# Patient Record
Sex: FEMALE | Race: WHITE | Employment: FULL TIME | URBAN - METROPOLITAN AREA
[De-identification: names, ages, dates, MRNs, and addresses within clinical notes are randomized per-mention and may not be internally consistent; named-entity substitution may affect disease eponyms.]

---

## 2017-04-24 ENCOUNTER — TELEPHONE (OUTPATIENT)
Dept: INTERNAL MEDICINE CLINIC | Age: 27
End: 2017-04-24

## 2017-04-24 NOTE — TELEPHONE ENCOUNTER
Pt got a bill from [facility] and stated that she received a bill for date of service: 12/19/2016, stated she was told [employer] faxed us something about resubmitting with different codes, still getting the bill and is wondering if the codes were changed or not.

## 2017-04-25 NOTE — TELEPHONE ENCOUNTER
Per , pt may need to bring in the bill. She said that Fabienne Myles was helping pt with this issue in December. Unsure why she is receiving the same bill.

## 2017-05-02 ENCOUNTER — TELEPHONE (OUTPATIENT)
Dept: INTERNAL MEDICINE CLINIC | Age: 27
End: 2017-05-02

## 2017-05-02 NOTE — TELEPHONE ENCOUNTER
Pt called back, stated she thinks it is not coded right, I told her I'd call my Manager and ask her to look and see if it had been re-coded and I would call her back, [phone]

## 2017-05-02 NOTE — TELEPHONE ENCOUNTER
----- Message from Marlys Arboleda sent at 5/2/2017  2:00 PM EDT -----  Regarding: Dr. Yohan Meza is following up regarding bill received from [employer] for services rendered on 12/19/2016. Pt states doctor advised the codes on the bill were wrong and needed to be fixed. Pt would like to know how long it will take for the codes to be updated and when she will have an updated bill. Pt is going out of town on Thursday 05/04/2017 and does not want to be late paying the bill. Best contact number [phone].

## 2017-05-02 NOTE — TELEPHONE ENCOUNTER
Called pt, voicemail was not set up yet. When I looked at the bill it looked to me like the balance was from her Deductible, but someone had been looking at it in the past and I do not know who was looking at it or if it was re-coded.  I will inform pt that from what I see it is from a deductible and pt might be responsible for bill

## 2017-08-02 ENCOUNTER — OFFICE VISIT (OUTPATIENT)
Dept: INTERNAL MEDICINE CLINIC | Age: 27
End: 2017-08-02

## 2017-08-02 VITALS
RESPIRATION RATE: 18 BRPM | HEART RATE: 69 BPM | BODY MASS INDEX: 23.26 KG/M2 | TEMPERATURE: 97 F | WEIGHT: 139.6 LBS | OXYGEN SATURATION: 99 % | DIASTOLIC BLOOD PRESSURE: 64 MMHG | SYSTOLIC BLOOD PRESSURE: 99 MMHG | HEIGHT: 65 IN

## 2017-08-02 DIAGNOSIS — Z00.00 WELL ADULT ON ROUTINE HEALTH CHECK: Primary | ICD-10-CM

## 2017-08-02 NOTE — PATIENT INSTRUCTIONS

## 2017-08-02 NOTE — PROGRESS NOTES
Subjective:   23 y.o. female for Well Woman Check. No significant past medical history. Doing well. Pap is up to date. Patient Active Problem List   Diagnosis Code    Sinusitis J32.9    FHx: allergies Z84.89    Elevated liver enzymes R74.8     Current Outpatient Prescriptions   Medication Sig Dispense Refill    fexofenadine (ALLEGRA) 180 mg tablet Take 1 Tab by mouth daily. 30 Tab 0     Allergies   Allergen Reactions    Cucumber Fruit Extract Hives     Past Medical History:   Diagnosis Date    Calculus of kidney     Environmental allergies      Past Surgical History:   Procedure Laterality Date    HX UROLOGICAL      Cystoscopic kidney stone removal     Family History   Problem Relation Age of Onset    Gall Bladder Disease Mother     No Known Problems Father      Social History   Substance Use Topics    Smoking status: Never Smoker    Smokeless tobacco: Never Used    Alcohol use No        Lab Results  Component Value Date/Time   Cholesterol, total 150 07/25/2016 09:29 AM   HDL Cholesterol 64 07/25/2016 09:29 AM   LDL, calculated 75 07/25/2016 09:29 AM   Triglyceride 53 07/25/2016 09:29 AM     Lab Results  Component Value Date/Time   ALT (SGPT) 17 11/02/2016 12:30 PM   AST (SGOT) 17 11/02/2016 12:30 PM   Alk. phosphatase 67 11/02/2016 12:30 PM   Bilirubin, direct 0.11 11/02/2016 12:30 PM   Bilirubin, total 0.4 11/02/2016 12:30 PM   Albumin 4.4 11/02/2016 12:30 PM   Protein, total 7.0 11/02/2016 12:30 PM   PLATELET 393 92/03/1853 09:29 AM              ROS: Feeling generally well. No TIA's or unusual headaches, no dysphagia. No prolonged cough. No dyspnea or chest pain on exertion. No abdominal pain, change in bowel habits, black or bloody stools. No urinary tract symptoms. No new or unusual musculoskeletal symptoms. Specific concerns today: none. Objective: The patient appears well, alert, oriented x 3, in no distress. Visit Vitals    BP 99/64 (BP 1 Location: Left arm, BP Patient Position: Sitting)    Pulse 69    Temp 97 °F (36.1 °C) (Oral)    Resp 18    Ht 5' 4.5\" (1.638 m)    Wt 139 lb 9.6 oz (63.3 kg)    LMP 07/27/2017    SpO2 99%    BMI 23.59 kg/m2     ENT normal.  Neck supple. No adenopathy or thyromegaly. MOUSTAPHA. Lungs are clear, good air entry, no wheezes, rhonchi or rales. S1 and S2 normal, no murmurs, regular rate and rhythm. Abdomen soft without tenderness, guarding, mass or organomegaly. Extremities show no edema, normal peripheral pulses. Neurological is normal, no focal findings. Breast and Pelvic exams are deferred. Assessment/Plan:   Well Woman  follow low fat diet, continue present plan, routine labs ordered, call if any problems    ICD-10-CM ICD-9-CM    1. Well adult on routine health check Z00.00 V70.0 LIPID PANEL      METABOLIC PANEL, COMPREHENSIVE      CANCELED: METABOLIC PANEL, BASIC     Diagnoses and all orders for this visit:    1.  Well adult on routine health check  -     LIPID PANEL  -     METABOLIC PANEL, COMPREHENSIVE      Follow-up Disposition: Not on File  reviewed diet, exercise and weight control

## 2017-08-02 NOTE — MR AVS SNAPSHOT
Visit Information Date & Time Provider Department Dept. Phone Encounter #  
 8/2/2017  1:30 PM Vijaya Lindsay MD [facility] Internal Medicine [phone] [id number] Upcoming Health Maintenance Date Due  
 HPV AGE 9Y-34Y (1 of 3 - Female 3 Dose Series) 8/31/2002 DTaP/Tdap/Td series (1 - Tdap) 8/31/2012 INFLUENZA AGE 9 TO ADULT 8/1/2017 PAP AKA CERVICAL CYTOLOGY 12/19/2019 Allergies as of 8/2/2017  Review Complete On: 8/2/2017 By: Deirdre Valera LPN Severity Noted Reaction Type Reactions Cucumber Fruit Extract  07/22/2015    Hives Current Immunizations  Reviewed on 11/2/2016 No immunizations on file. Not reviewed this visit You Were Diagnosed With   
  
 Codes Comments Well adult on routine health check    -  Primary ICD-10-CM: Z00.00 ICD-9-CM: V70.0 Vitals BP Pulse Temp Resp Height(growth percentile) Weight(growth percentile) 99/64 (BP 1 Location: Left arm, BP Patient Position: Sitting) 69 97 °F (36.1 °C) (Oral) 18 5' 4.5\" (1.638 m) 139 lb 9.6 oz (63.3 kg) LMP SpO2 BMI OB Status Smoking Status 07/27/2017 99% 23.59 kg/m2 Having regular periods Never Smoker Vitals History BMI and BSA Data Body Mass Index Body Surface Area  
 23.59 kg/m 2 1.7 m 2 Preferred Pharmacy Pharmacy Name Phone [phone] Your Updated Medication List  
  
   
This list is accurate as of: 8/2/17  2:24 PM.  Always use your most recent med list.  
  
  
  
  
 fexofenadine 180 mg tablet Commonly known as:  [place] Take 1 Tab by mouth daily. We Performed the Following LIPID PANEL [86039 CPT(R)] METABOLIC PANEL, BASIC [90817 CPT(R)] Patient Instructions Well Visit, Ages 25 to 48: Care Instructions Your Care Instructions Physical exams can help you stay healthy.  Your doctor has checked your overall health and may have suggested ways to take good care of yourself. He or she also may have recommended tests. At home, you can help prevent illness with healthy eating, regular exercise, and other steps. Follow-up care is a key part of your treatment and safety. Be sure to make and go to all appointments, and call your doctor if you are having problems. It's also a good idea to know your test results and keep a list of the medicines you take. How can you care for yourself at home? · Reach and stay at a healthy weight. This will lower your risk for many problems, such as obesity, diabetes, heart disease, and high blood pressure. · Get at least 30 minutes of physical activity on most days of the week. Walking is a good choice. You also may want to do other activities, such as running, swimming, cycling, or playing tennis or team sports. Discuss any changes in your exercise program with your doctor. · Do not smoke or allow others to smoke around you. If you need help quitting, talk to your doctor about stop-smoking programs and medicines. These can increase your chances of quitting for good. · Talk to your doctor about whether you have any risk factors for sexually transmitted infections (STIs). Having one sex partner (who does not have STIs and does not have sex with anyone else) is a good way to avoid these infections. · Use birth control if you do not want to have children at this time. Talk with your doctor about the choices available and what might be best for you. · Protect your skin from too much sun. When you're outdoors from 10 a.m. to 4 p.m., stay in the shade or cover up with clothing and a hat with a wide brim. Wear sunglasses that block UV rays. Even when it's cloudy, put broad-spectrum sunscreen (SPF 30 or higher) on any exposed skin. · See a dentist one or two times a year for checkups and to have your teeth cleaned. · Wear a seat belt in the car. · Drink alcohol in moderation, if at all. That means no more than 2 drinks a day for men and 1 drink a day for women. Follow your doctor's advice about when to have certain tests. These tests can spot problems early. For everyone · Cholesterol. Have the fat (cholesterol) in your blood tested after age 21. Your doctor will tell you how often to have this done based on your age, family history, or other things that can increase your risk for heart disease. · Blood pressure. Have your blood pressure checked during a routine doctor visit. Your doctor will tell you how often to check your blood pressure based on your age, your blood pressure results, and other factors. · Vision. Talk with your doctor about how often to have a glaucoma test. 
· Diabetes. Ask your doctor whether you should have tests for diabetes. · Colon cancer. Have a test for colon cancer at age 48. You may have one of several tests. If you are younger than 48, you may need a test earlier if you have any risk factors. Risk factors include whether you already had a precancerous polyp removed from your colon or whether your parent, brother, sister, or child has had colon cancer. For women · Breast exam and mammogram. Talk to your doctor about when you should have a clinical breast exam and a mammogram. Medical experts differ on whether and how often women under 50 should have these tests. Your doctor can help you decide what is right for you. · Pap test and pelvic exam. Begin Pap tests at age 24. A Pap test is the best way to find cervical cancer. The test often is part of a pelvic exam. Ask how often to have this test. 
· Tests for sexually transmitted infections (STIs). Ask whether you should have tests for STIs. You may be at risk if you have sex with more than one person, especially if your partners do not wear condoms. For men · Tests for sexually transmitted infections (STIs).  Ask whether you should have tests for STIs. You may be at risk if you have sex with more than one person, especially if you do not wear a condom. · Testicular cancer exam. Ask your doctor whether you should check your testicles regularly. · Prostate exam. Talk to your doctor about whether you should have a blood test (called a PSA test) for prostate cancer. Experts differ on whether and when men should have this test. Some experts suggest it if you are older than 39 and are -American or have a father or brother who got prostate cancer when he was younger than 72. When should you call for help? Watch closely for changes in your health, and be sure to contact your doctor if you have any problems or symptoms that concern you. Where can you learn more? Go to http://alma rosa-alpesh.info/. Enter P072 in the search box to learn more about \"Well Visit, Ages 25 to 48: Care Instructions. \" Current as of: July 19, 2016 Content Version: 11.3 © 4822-1046 [employer]. Care instructions adapted under license by [employer] (which disclaims liability or warranty for this information). If you have questions about a medical condition or this instruction, always ask your healthcare professional. [place] [address] any warranty or liability for your use of this information. Introducing [facility]! Chau Suarez introduces [employer] patient portal. Now you can access parts of your medical record, email your doctor's office, and request medication refills online. 1. In your internet browser, go to [email or web address] 2. Click on the First Time User? Click Here link in the Sign In box. You will see the New Member Sign Up page. 3. Enter your [employer] Access Code exactly as it appears below. You will not need to use this code after youve completed the sign-up process. If you do not sign up before the expiration date, you must request a new code. · [employer] Access Code: [email or web address] Expires: 10/31/2017  2:24 PM 
 
4. Enter the last four digits of your Social Security Number (xxxx) and Date of Birth (mm/dd/yyyy) as indicated and click Submit. You will be taken to the next sign-up page. 5. Create a [employer] ID. This will be your [employer] login ID and cannot be changed, so think of one that is secure and easy to remember. 6. Create a [employer] password. You can change your password at any time. 7. Enter your Password Reset Question and Answer. This can be used at a later time if you forget your password. 8. Enter your e-mail address. You will receive e-mail notification when new information is available in [address] 9. Click Sign Up. You can now view and download portions of your medical record. 10. Click the Download Summary menu link to download a portable copy of your medical information. If you have questions, please visit the Frequently Asked Questions section of the [employer] website. Remember, [employer] is NOT to be used for urgent needs. For medical emergencies, dial 911. Now available from your iPhone and Android! Please provide this summary of care documentation to your next provider. Your primary care clinician is listed as Howard Waddell. If you have any questions after today's visit, please call [phone].

## 2017-08-03 ENCOUNTER — TELEPHONE (OUTPATIENT)
Dept: INTERNAL MEDICINE CLINIC | Age: 27
End: 2017-08-03

## 2017-08-03 LAB
ALBUMIN SERPL-MCNC: 4.5 G/DL (ref 3.5–5.5)
ALBUMIN/GLOB SERPL: 1.8 {RATIO} (ref 1.2–2.2)
ALP SERPL-CCNC: 68 IU/L (ref 39–117)
ALT SERPL-CCNC: 35 IU/L (ref 0–32)
AST SERPL-CCNC: 25 IU/L (ref 0–40)
BILIRUB SERPL-MCNC: 0.5 MG/DL (ref 0–1.2)
BUN SERPL-MCNC: 9 MG/DL (ref 6–20)
BUN/CREAT SERPL: 16 (ref 9–23)
CALCIUM SERPL-MCNC: 9.1 MG/DL (ref 8.7–10.2)
CHLORIDE SERPL-SCNC: 103 MMOL/L (ref 96–106)
CHOLEST SERPL-MCNC: 141 MG/DL (ref 100–199)
CO2 SERPL-SCNC: 24 MMOL/L (ref 18–29)
CREAT SERPL-MCNC: 0.58 MG/DL (ref 0.57–1)
GLOBULIN SER CALC-MCNC: 2.5 G/DL (ref 1.5–4.5)
GLUCOSE SERPL-MCNC: 78 MG/DL (ref 65–99)
HDLC SERPL-MCNC: 54 MG/DL
INTERPRETATION, 910389: NORMAL
LDLC SERPL CALC-MCNC: 81 MG/DL (ref 0–99)
POTASSIUM SERPL-SCNC: 4.1 MMOL/L (ref 3.5–5.2)
PROT SERPL-MCNC: 7 G/DL (ref 6–8.5)
SODIUM SERPL-SCNC: 140 MMOL/L (ref 134–144)
TRIGL SERPL-MCNC: 31 MG/DL (ref 0–149)
VLDLC SERPL CALC-MCNC: 6 MG/DL (ref 5–40)

## 2017-08-03 NOTE — TELEPHONE ENCOUNTER
----- Message from Shiva Correa MD sent at 8/3/2017 10:20 AM EDT -----  Please let her know that:  Kidney, liver, cholesterol level is normal.     Pl fax the copy wellness physical form to her employer.

## 2017-08-03 NOTE — PROGRESS NOTES
Please let her know that:  Kidney, liver, cholesterol level is normal.     Pl fax the copy wellness physical form to her employer.

## 2017-08-03 NOTE — TELEPHONE ENCOUNTER
Discussed results and recommendations. Pt verbalized her understanding. Requests for employer screening form to be faxed to her at [phone].

## 2017-12-08 ENCOUNTER — OFFICE VISIT (OUTPATIENT)
Dept: INTERNAL MEDICINE CLINIC | Age: 27
End: 2017-12-08

## 2017-12-08 VITALS
BODY MASS INDEX: 24.16 KG/M2 | HEIGHT: 65 IN | TEMPERATURE: 97.8 F | SYSTOLIC BLOOD PRESSURE: 92 MMHG | DIASTOLIC BLOOD PRESSURE: 61 MMHG | WEIGHT: 145 LBS | OXYGEN SATURATION: 100 % | HEART RATE: 95 BPM | RESPIRATION RATE: 18 BRPM

## 2017-12-08 DIAGNOSIS — R09.81 NASAL CONGESTION: ICD-10-CM

## 2017-12-08 DIAGNOSIS — J20.9 ACUTE BRONCHITIS, UNSPECIFIED ORGANISM: Primary | ICD-10-CM

## 2017-12-08 DIAGNOSIS — R05.3 PERSISTENT DRY COUGH: ICD-10-CM

## 2017-12-08 RX ORDER — ALBUTEROL SULFATE 90 UG/1
1 AEROSOL, METERED RESPIRATORY (INHALATION)
Qty: 1 INHALER | Refills: 0 | Status: SHIPPED | OUTPATIENT
Start: 2017-12-08 | End: 2019-02-05 | Stop reason: SDUPTHER

## 2017-12-08 RX ORDER — FLUTICASONE PROPIONATE 50 MCG
2 SPRAY, SUSPENSION (ML) NASAL DAILY
Qty: 1 BOTTLE | Refills: 0 | Status: SHIPPED | OUTPATIENT
Start: 2017-12-08 | End: 2017-12-20

## 2017-12-08 RX ORDER — PROMETHAZINE HYDROCHLORIDE AND CODEINE PHOSPHATE 6.25; 1 MG/5ML; MG/5ML
5 SOLUTION ORAL
Qty: 30 ML | Refills: 0 | Status: SHIPPED | OUTPATIENT
Start: 2017-12-08 | End: 2018-06-22 | Stop reason: ALTCHOICE

## 2017-12-08 NOTE — LETTER
NOTIFICATION RETURN TO WORK / SCHOOL 
 
12/8/2017 11:02 AM 
 
Ms. [address] 
[place] [address] To Whom It May Concern: 
 
Ivett Becerra is currently under the care of Santhosh. She will return to work/school on: 12/11/17 If there are questions or concerns please have the patient contact our office.  
 
 
 
Sincerely, 
 
 
Layla Eric MD

## 2017-12-08 NOTE — PROGRESS NOTES
Subjective:     Chief Complaint   Patient presents with    Shortness of Breath    Cough        She  is a 33y.o. year old 24 weeks pregnant  female who presents today with a complain of persistent dry cough, chest tightness for the past two weeks. Reports that she went to [facility] ED two weeks ago, diagnosed with UTI and bronchitis. She was sent home with amoxicillin. Reports that she is no longer having any UTI symptoms, sore throat been gone but the cough persists. Nose feels stuffy. Feels chest tightness, sob, cannot sleep at night due to cough. Denies fever, sore throat. Did not try any OTC med. Patient has taken Phenergan-codeine in the past which helped with her cough. Pertinent items are noted in HPI. Objective:     Vitals:    12/08/17 1034   BP: 92/61   Pulse: 95   Resp: 18   Temp: 97.8 °F (36.6 °C)   TempSrc: Oral   SpO2: 100%   Weight: 145 lb (65.8 kg)   Height: 5' 4.5\" (1.638 m)       Physical Examination: General appearance - alert, well appearing, and in no distress, oriented to person, place, and time, normal appearing weight and coughing persistently.   Mental status - alert, oriented to person, place, and time, normal mood, behavior, speech, dress, motor activity, and thought processes  Ears - bilateral TM's and external ear canals normal  Nose - normal and patent, no erythema, discharge or polyps, normal nontender sinuses and mucosal congestion  Mouth - mucous membranes moist, pharynx normal without lesions  Neck - supple, no significant adenopathy  Chest - clear to auscultation, no wheezes, rales or rhonchi, symmetric air entry  Heart - normal rate, regular rhythm, normal S1, S2, no murmurs, rubs, clicks or gallops    Allergies   Allergen Reactions    Cucumber Fruit Extract Hives      Social History     Social History    Marital status:      Spouse name: N/A    Number of children: N/A    Years of education: N/A     Social History Main Topics    Smoking status: Never Smoker    Smokeless tobacco: Never Used    Alcohol use No    Drug use: No    Sexual activity: Yes     Birth control/ protection: None     Other Topics Concern    None     Social History Narrative      Family History   Problem Relation Age of Onset    Gall Bladder Disease Mother     No Known Problems Father       Past Surgical History:   Procedure Laterality Date    HX UROLOGICAL      Cystoscopic kidney stone removal      Past Medical History:   Diagnosis Date    Calculus of kidney     Environmental allergies       Current Outpatient Prescriptions   Medication Sig Dispense Refill    Cetirizine (ZYRTEC) 10 mg cap Take  by mouth.  albuterol (PROVENTIL HFA, VENTOLIN HFA, PROAIR HFA) 90 mcg/actuation inhaler Take 1 Puff by inhalation every four (4) hours as needed for Shortness of Breath. 1 Inhaler 0    promethazine-codeine (PHENERGAN WITH CODEINE) 6.25-10 mg/5 mL syrup Take 5 mL by mouth every six (6) hours as needed for Cough. Max Daily Amount: 20 mL. 30 mL 0    fluticasone (FLONASE) 50 mcg/actuation nasal spray 2 Sprays by Both Nostrils route daily. 1 Bottle 0    ORGAN-I  mg tablet TAKE 1 TABLET BY MOUTH EVERY 4 HOURS AS NEEDED  0        Assessment/ Plan:   Diagnoses and all orders for this visit:    1. Acute bronchitis, unspecified organism  -     albuterol (PROVENTIL HFA, VENTOLIN HFA, PROAIR HFA) 90 mcg/actuation inhaler; Take 1 Puff by inhalation every four (4) hours as needed for Shortness of Breath. -     promethazine-codeine (PHENERGAN WITH CODEINE) 6.25-10 mg/5 mL syrup; Take 5 mL by mouth every six (6) hours as needed for Cough. Max Daily Amount: 20 mL. 2. Persistent dry cough  -     albuterol (PROVENTIL HFA, VENTOLIN HFA, PROAIR HFA) 90 mcg/actuation inhaler; Take 1 Puff by inhalation every four (4) hours as needed for Shortness of Breath. -     promethazine-codeine (PHENERGAN WITH CODEINE) 6.25-10 mg/5 mL syrup; Take 5 mL by mouth every six (6) hours as needed for Cough.  Max Daily Amount: 20 mL.    3. Nasal congestion  -     fluticasone (FLONASE) 50 mcg/actuation nasal spray; 2 Sprays by Both Nostrils route daily. Mist inhalation, rest, hydration. Follow up if symptoms worsen. Medication risks/benefits/costs/interactions/alternatives discussed with patient. Advised patient to call back or return to office if symptoms worsen/change/persist. If patient cannot reach us or should anything more severe/urgent arise he/she should proceed directly to the nearest emergency department. Discussed expected course/resolution/complications of diagnosis in detail with patient. Patient given a written after visit summary which includes her diagnoses, current medications and vitals. Patient expressed understanding with the diagnosis and plan. Follow-up Disposition:  Return if symptoms worsen or fail to improve.

## 2017-12-20 ENCOUNTER — TELEPHONE (OUTPATIENT)
Dept: INTERNAL MEDICINE CLINIC | Age: 27
End: 2017-12-20

## 2017-12-20 RX ORDER — MOMETASONE FUROATE 50 UG/1
2 SPRAY, METERED NASAL DAILY
Qty: 1 CONTAINER | Refills: 2 | Status: SHIPPED | OUTPATIENT
Start: 2017-12-20 | End: 2019-08-08

## 2017-12-20 NOTE — TELEPHONE ENCOUNTER
Please inform patient that flonase was not covered by her insurance. Alternative med sent to pharmacy.

## 2017-12-21 ENCOUNTER — TELEPHONE (OUTPATIENT)
Dept: INTERNAL MEDICINE CLINIC | Age: 27
End: 2017-12-21

## 2017-12-21 NOTE — TELEPHONE ENCOUNTER
----- Message from Tona Arias sent at 12/21/2017  1:06 PM EST -----  Regarding: Dr. Ethel Joel is returning a call to the practice, best contact: [phone]

## 2017-12-21 NOTE — TELEPHONE ENCOUNTER
Informed pt that prescriptions for nasonex and Flonase were not covered by insurance. They want her to get it OTC. Pt verbalized her understanding. States that the pharmacist already advised her of this.

## 2018-06-22 ENCOUNTER — OFFICE VISIT (OUTPATIENT)
Dept: INTERNAL MEDICINE CLINIC | Age: 28
End: 2018-06-22

## 2018-06-22 VITALS
OXYGEN SATURATION: 97 % | TEMPERATURE: 97.4 F | RESPIRATION RATE: 17 BRPM | SYSTOLIC BLOOD PRESSURE: 108 MMHG | BODY MASS INDEX: 25.16 KG/M2 | HEIGHT: 65 IN | HEART RATE: 62 BPM | DIASTOLIC BLOOD PRESSURE: 65 MMHG | WEIGHT: 151 LBS

## 2018-06-22 DIAGNOSIS — J34.89 STUFFY AND RUNNY NOSE: ICD-10-CM

## 2018-06-22 DIAGNOSIS — J02.9 SORE THROAT: Primary | ICD-10-CM

## 2018-06-22 DIAGNOSIS — R06.7 SNEEZING: ICD-10-CM

## 2018-06-22 LAB
S PYO AG THROAT QL: NEGATIVE
VALID INTERNAL CONTROL?: YES

## 2018-06-22 RX ORDER — MINERAL OIL
ENEMA (ML) RECTAL
COMMUNITY
End: 2019-08-08

## 2018-06-22 RX ORDER — FLUTICASONE PROPIONATE 50 MCG
2 SPRAY, SUSPENSION (ML) NASAL DAILY
Qty: 1 BOTTLE | Refills: 0 | Status: SHIPPED | OUTPATIENT
Start: 2018-06-22 | End: 2019-08-08

## 2018-06-22 RX ORDER — LEVONORGESTREL AND ETHINYL ESTRADIOL 0.1-0.02MG
KIT ORAL
COMMUNITY
Start: 2018-06-21

## 2018-06-22 NOTE — MR AVS SNAPSHOT
[facility] [address] 
[phone] Patient: Mendez Sky MRN: [id number] Marie Howell Visit Information Date & Time Provider Department Dept. Phone Encounter #  
 6/22/2018  9:45 AM Howard Boles [facility] [phone] [id number] Follow-up Instructions Return if symptoms worsen or fail to improve. Upcoming Health Maintenance Date Due  
 HPV Age 9Y-34Y (1 of 1 - Female 3 Dose Series) 8/31/2002 DTaP/Tdap/Td series (1 - Tdap) 8/31/2012 Influenza Age 5 to Adult 8/1/2018 PAP AKA CERVICAL CYTOLOGY 12/19/2019 Allergies as of 6/22/2018  Review Complete On: 6/22/2018 By: Sofie Nageotte, MD  
  
 Severity Noted Reaction Type Reactions Cucumber Fruit Extract  07/22/2015    Hives Current Immunizations  Reviewed on 11/2/2016 No immunizations on file. Not reviewed this visit You Were Diagnosed With   
  
 Codes Comments Sore throat    -  Primary ICD-10-CM: J02.9 ICD-9-CM: 086 Stuffy and runny nose     ICD-10-CM: J34.89 ICD-9-CM: 478.19 Sneezing     ICD-10-CM: R06.7 ICD-9-CM: 784.99 Vitals BP Pulse Temp Resp Height(growth percentile) Weight(growth percentile) 108/65 (BP 1 Location: Left arm, BP Patient Position: Sitting) 62 97.4 °F (36.3 °C) (Temporal) 17 5' 4.5\" (1.638 m) 151 lb (68.5 kg) LMP SpO2 Breastfeeding? BMI OB Status Smoking Status 06/13/2018 97% Unknown 25.52 kg/m2 Recent pregnancy Never Smoker Vitals History BMI and BSA Data Body Mass Index Body Surface Area 25.52 kg/m 2 1.77 m 2 Preferred Pharmacy Pharmacy Name Phone [id number] [place], [address] [facility] [phone] Your Updated Medication List  
  
   
This list is accurate as of 6/22/18 10:14 AM.  Always use your most recent med list.  
  
  
  
  
 albuterol 90 mcg/actuation inhaler Commonly known as:  PROVENTIL HFA, VENTOLIN HFA, PROAIR HFA Take 1 Puff by inhalation every four (4) hours as needed for Shortness of Breath. fexofenadine 180 mg tablet Commonly known as:  Nishant Perks Take  by mouth. fluticasone 50 mcg/actuation nasal spray Commonly known as:  [place] 2 Sprays by Both Nostrils route daily. LARISSIA 0.1-20 mg-mcg Tab Generic drug:  levonorgestrel-ethinyl estradiol  
  
 mometasone 50 mcg/actuation nasal spray Commonly known as:  NASONEX  
2 Sprays by Both Nostrils route daily. ZyrTEC 10 mg Cap Generic drug:  Cetirizine Take  by mouth. Prescriptions Sent to Pharmacy Refills  
 fluticasone (FLONASE) 50 mcg/actuation nasal spray 0 Si Sprays by Both Nostrils route daily. Class: Normal  
 Pharmacy: [facility] [place] [address] #: [phone] Route: Both Nostrils We Performed the Following AMB POC RAPID STREP A [66288 CPT(R)] CULTURE, STREP THROAT [id number] CPT(R)] Follow-up Instructions Return if symptoms worsen or fail to improve. Introducing [facility]! Ale Kincaid introduces [employer] patient portal. Now you can access parts of your medical record, email your doctor's office, and request medication refills online. 1. In your internet browser, go to [email or web address] 2. Click on the First Time User? Click Here link in the Sign In box. You will see the New Member Sign Up page. 3. Enter your [employer] Access Code exactly as it appears below. You will not need to use this code after youve completed the sign-up process. If you do not sign up before the expiration date, you must request a new code. · [employer] Access Code: [email or web address] Expires: 2018 10:14 AM 
 
4.  Enter the last four digits of your Social Security Number (xxxx) and Date of Birth (mm/dd/yyyy) as indicated and click Submit. You will be taken to the next sign-up page. 5. Create a [employer] ID. This will be your [employer] login ID and cannot be changed, so think of one that is secure and easy to remember. 6. Create a [employer] password. You can change your password at any time. 7. Enter your Password Reset Question and Answer. This can be used at a later time if you forget your password. 8. Enter your e-mail address. You will receive e-mail notification when new information is available in [address] 9. Click Sign Up. You can now view and download portions of your medical record. 10. Click the Download Summary menu link to download a portable copy of your medical information. If you have questions, please visit the Frequently Asked Questions section of the [employer] website. Remember, [employer] is NOT to be used for urgent needs. For medical emergencies, dial 911. Now available from your iPhone and Android! Please provide this summary of care documentation to your next provider. Your primary care clinician is listed as Howard Waddell. If you have any questions after today's visit, please call [phone].

## 2018-06-25 ENCOUNTER — TELEPHONE (OUTPATIENT)
Dept: INTERNAL MEDICINE CLINIC | Age: 28
End: 2018-06-25

## 2018-06-25 LAB — S PYO THROAT QL CULT: ABNORMAL

## 2018-06-25 RX ORDER — BENZONATATE 200 MG/1
200 CAPSULE ORAL
Qty: 21 CAP | Refills: 0 | Status: SHIPPED | OUTPATIENT
Start: 2018-06-25 | End: 2018-07-02

## 2018-06-25 NOTE — PROGRESS NOTES
Subjective:     Chief Complaint   Patient presents with    Sore Throat     started yesterday. Went to ER yesterday and nothing was done. Was told that it was allergies    Nasal Discharge    Headache    Cough     dry cough        She  is a 33y.o. year old female who presents with a c/o sore throat started yesterday associated with sneezing, clear runny nose, head congestion and dry cough. States that she went to [address] yesterday and was told that this is allergy related. Started taking allegra and zyrtec sinec yesterday but did not see nay improvement. Denies any fever, chills, soa, wheezing associated with that. Pertinent items are noted in HPI.   Objective:     Vitals:    06/22/18 0950   BP: 108/65   Pulse: 62   Resp: 17   Temp: 97.4 °F (36.3 °C)   TempSrc: Temporal   SpO2: 97%   Weight: 151 lb (68.5 kg)   Height: 5' 4.5\" (1.638 m)       Physical Examination: General appearance - alert, well appearing, and in no distress, oriented to person, place, and time and normal appearing weight  Mental status - alert, oriented to person, place, and time, normal mood, behavior, speech, dress, motor activity, and thought processes  Eyes - pupils equal and reactive, extraocular eye movements intact  Ears - bilateral TM's and external ear canals normal  Nose - normal nontender sinuses, mucosal congestion and clear rhinorrhea  Mouth - mucous membranes moist, pharynx slightly erythematous without lesions and tonsils hypertrophied with no exudate  Neck - supple, no significant adenopathy  Chest - clear to auscultation, no wheezes, rales or rhonchi, symmetric air entry  Heart - normal rate, regular rhythm, normal S1, S2, no murmurs, rubs, clicks or gallops    Allergies   Allergen Reactions    Cucumber Fruit Extract Hives      Social History     Social History    Marital status:      Spouse name: N/A    Number of children: N/A    Years of education: N/A     Social History Main Topics    Smoking status: Never Smoker  Smokeless tobacco: Never Used    Alcohol use No    Drug use: No    Sexual activity: Yes     Birth control/ protection: None     Other Topics Concern    None     Social History Narrative      Family History   Problem Relation Age of Onset    Gall Bladder Disease Mother     No Known Problems Father       Past Surgical History:   Procedure Laterality Date    HX UROLOGICAL      Cystoscopic kidney stone removal      Past Medical History:   Diagnosis Date    Calculus of kidney     Environmental allergies       Current Outpatient Prescriptions   Medication Sig Dispense Refill    LARISSIA 0.1-20 mg-mcg tab       fexofenadine (ALLEGRA) 180 mg tablet Take  by mouth.  fluticasone (FLONASE) 50 mcg/actuation nasal spray 2 Sprays by Both Nostrils route daily. 1 Bottle 0    mometasone (NASONEX) 50 mcg/actuation nasal spray 2 Sprays by Both Nostrils route daily. 1 Container 2    Cetirizine (ZYRTEC) 10 mg cap Take  by mouth.  albuterol (PROVENTIL HFA, VENTOLIN HFA, PROAIR HFA) 90 mcg/actuation inhaler Take 1 Puff by inhalation every four (4) hours as needed for Shortness of Breath. 1 Inhaler 0        Assessment/ Plan:   Diagnoses and all orders for this visit:    1. Sore throat  -     AMB POC RAPID STREP A  -     CULTURE, STREP THROAT        - advised salt water gurgle, OTC tylenol/advil for pain. 2. Stuffy and runny nose  -    start fluticasone (FLONASE) 50 mcg/actuation nasal spray; 2 Sprays by Both Nostrils route daily. - Continue current allergy med. -  Mist inhalation. 3. Sneezing  -    start fluticasone (FLONASE) 50 mcg/actuation nasal spray; 2 Sprays by Both Nostrils route daily. - Continue current allergy med. -  Mist inhalation. Medication risks/benefits/costs/interactions/alternatives discussed with patient.   Advised patient to call back or return to office if symptoms worsen/change/persist. If patient cannot reach us or should anything more severe/urgent arise he/she should proceed directly to the nearest emergency department. Discussed expected course/resolution/complications of diagnosis in detail with patient. Patient given a written after visit summary which includes her diagnoses, current medications and vitals. Patient expressed understanding with the diagnosis and plan. Follow-up Disposition:  Return if symptoms worsen or fail to improve.

## 2018-06-25 NOTE — PROGRESS NOTES
Left voice mail to call us back    Let me know how is she feeling. Will send Abx if she continue to have any sore throat.

## 2018-06-25 NOTE — TELEPHONE ENCOUNTER
----- Message from Antoine Mcneil LPN sent at 8/02/3909 12:39 PM EDT -----      ----- Message -----     From: Gregory Arguello MD     Sent: 6/25/2018   9:38 AM       To: Antoine Mcneil LPN    Left voice mail to call us back    Let me know how is she feeling. Will send Abx if she continue to have any sore throat.

## 2018-06-25 NOTE — TELEPHONE ENCOUNTER
Pt states that she not having any sore throat but still coughing really bad at night. Pt states that she has been taking mucinex and it has been helping. Advised per , to continue mucinex and tessalon pearls will be sent to the pharmacy to take as needed for cough. Pt verbalized her understanding.

## 2018-06-25 NOTE — TELEPHONE ENCOUNTER
Pt called back and spoke with nurse. States that she is no longer having any sore throat but having dry cough at night. Advised that Madisynsalon jose will be sent to pharmacy and advised to f/u if symptoms worsen.

## 2018-06-25 NOTE — TELEPHONE ENCOUNTER
----- Message from Melvi Warren MD sent at 6/25/2018  9:38 AM EDT -----  Left voice mail to call us back    Let me know how is she feeling. Will send Abx if she continue to have any sore throat.

## 2018-07-24 NOTE — H&P
CARE TEAM:  Patient Care Team:  Esther Bedolla MD as PCP - General (Family Medicine)     ASSESSMENT:  1. Pyogenic granuloma of skin          There is no problem list on file for this patient.        PLAN:  Treatment Plan:  I recommend surgical excision in the operating room. We have discussed the reasonable expectations. We discussed usual postop course. Local anesthesia.         Orders Placed This Encounter    BMI >=25 PATIENT INSTRUCTIONS & EDUCATION      Follow-up: Return for first postoperative visit.      HISTORY OF PRESENT ILLNESS:  Chief Complaint: Mass of the Left Little Finger     Age: 33 y.o. Sex: female   History of present illness: Pearl Liang a pleasant 33 y.o. female who presents today for left small finger mass. She complains of a bleeding painful mass of the left small finger for about a month. Denies any history of trauma. She recently had a child. Denies numbness. No treatment. Recently seen by dermatologist and referred to me.     Past medical history, past surgical history, medications, allergies, social history, and review of systems have been reviewed by me. No current facility-administered medications on file prior to encounter. Current Outpatient Prescriptions on File Prior to Encounter   Medication Sig Dispense Refill    LARISSIA 0.1-20 mg-mcg tab       fexofenadine (ALLEGRA) 180 mg tablet Take  by mouth.  fluticasone (FLONASE) 50 mcg/actuation nasal spray 2 Sprays by Both Nostrils route daily. 1 Bottle 0    mometasone (NASONEX) 50 mcg/actuation nasal spray 2 Sprays by Both Nostrils route daily. 1 Container 2    Cetirizine (ZYRTEC) 10 mg cap Take  by mouth.  albuterol (PROVENTIL HFA, VENTOLIN HFA, PROAIR HFA) 90 mcg/actuation inhaler Take 1 Puff by inhalation every four (4) hours as needed for Shortness of Breath.  1 Inhaler 0       Past Medical History:   Diagnosis Date    Calculus of kidney     Environmental allergies        Allergies   Allergen Reactions    Cucumber Fruit Extract Hives       Social History     Social History    Marital status:      Spouse name: N/A    Number of children: N/A    Years of education: N/A     Occupational History    Not on file. Social History Main Topics    Smoking status: Never Smoker    Smokeless tobacco: Never Used    Alcohol use No    Drug use: No    Sexual activity: Yes     Birth control/ protection: None     Other Topics Concern    Not on file     Social History Narrative          Review of Systems   7/24/2018     Constitutional: Unexplained: Negative  Genitourinary: Frequent Urination: Positive  HEENT: Vision Loss: Negative  Neurological: Memory Loss: Negative  Integumentary: Rash: Negative  Cardiovascular: Palpatations: Negative  Hematologic: Bruises/Bleeds Easily: Positive  Gastrointestinal: Constipation: Negative  Immunological: Seasonal Allergies: Positive  Musculoskeletal: Joint Pain: Negative        OBJECTIVE:  Constitutional:  No acute distress. Pleasant and cooperative with exam.  HEENT: Normocephalic. Atraumatic. Eyes are clear  Hearing intact to spoken word   Respiratory:  Breathing unlabored. .  Cardiovascular:  No marked edema. Psychiatric: Alert.  Affect appropriate. Gait: Normal.  Musculoskeletal    She has a very large pyogenic granuloma on the volar aspect of the small finger. This appears to have a very large base. Mass Measures approximately 2 cm. Good digital motion.   Intact sensibility.     IMAGING / STUDIES:           No imaging obtained

## 2018-07-25 ENCOUNTER — ANESTHESIA (OUTPATIENT)
Dept: MEDSURG UNIT | Age: 28
End: 2018-07-25
Payer: COMMERCIAL

## 2018-07-25 ENCOUNTER — HOSPITAL ENCOUNTER (OUTPATIENT)
Age: 28
Setting detail: OUTPATIENT SURGERY
Discharge: HOME | End: 2018-07-25
Attending: ORTHOPAEDIC SURGERY | Admitting: ORTHOPAEDIC SURGERY
Payer: COMMERCIAL

## 2018-07-25 ENCOUNTER — ANESTHESIA EVENT (OUTPATIENT)
Dept: MEDSURG UNIT | Age: 28
End: 2018-07-25
Payer: COMMERCIAL

## 2018-07-25 VITALS
HEART RATE: 76 BPM | OXYGEN SATURATION: 100 % | TEMPERATURE: 97.6 F | HEIGHT: 64 IN | SYSTOLIC BLOOD PRESSURE: 103 MMHG | DIASTOLIC BLOOD PRESSURE: 75 MMHG | WEIGHT: 151 LBS | RESPIRATION RATE: 16 BRPM | BODY MASS INDEX: 25.78 KG/M2

## 2018-07-25 DIAGNOSIS — L98.0 PYOGENIC GRANULOMA OF SKIN: Primary | ICD-10-CM

## 2018-07-25 LAB
HCG UR QL: NEGATIVE
HGB BLD-MCNC: 13.5 G/DL (ref 11.5–16)

## 2018-07-25 PROCEDURE — 77030002916 HC SUT ETHLN J&J -A: Performed by: ORTHOPAEDIC SURGERY

## 2018-07-25 PROCEDURE — 77030018836 HC SOL IRR NACL ICUM -A: Performed by: ORTHOPAEDIC SURGERY

## 2018-07-25 PROCEDURE — 85018 HEMOGLOBIN: CPT

## 2018-07-25 PROCEDURE — 88305 TISSUE EXAM BY PATHOLOGIST: CPT | Performed by: ORTHOPAEDIC SURGERY

## 2018-07-25 PROCEDURE — 74011250636 HC RX REV CODE- 250/636: Performed by: ORTHOPAEDIC SURGERY

## 2018-07-25 PROCEDURE — 74011000250 HC RX REV CODE- 250

## 2018-07-25 PROCEDURE — 81025 URINE PREGNANCY TEST: CPT

## 2018-07-25 PROCEDURE — 74011250636 HC RX REV CODE- 250/636

## 2018-07-25 PROCEDURE — 76030000002 HC AMB SURG OR TIME FIRST 0.: Performed by: ORTHOPAEDIC SURGERY

## 2018-07-25 PROCEDURE — 77030013479 HC CUF TRNQ COT DGT MARM -A: Performed by: ORTHOPAEDIC SURGERY

## 2018-07-25 PROCEDURE — 77030020782 HC GWN BAIR PAWS FLX 3M -B

## 2018-07-25 PROCEDURE — 74011000250 HC RX REV CODE- 250: Performed by: ORTHOPAEDIC SURGERY

## 2018-07-25 PROCEDURE — 76210000034 HC AMBSU PH I REC 0.5 TO 1 HR: Performed by: ORTHOPAEDIC SURGERY

## 2018-07-25 PROCEDURE — 76210000046 HC AMBSU PH II REC FIRST 0.5 HR: Performed by: ORTHOPAEDIC SURGERY

## 2018-07-25 RX ORDER — FENTANYL CITRATE 50 UG/ML
INJECTION, SOLUTION INTRAMUSCULAR; INTRAVENOUS AS NEEDED
Status: DISCONTINUED | OUTPATIENT
Start: 2018-07-25 | End: 2018-07-25 | Stop reason: HOSPADM

## 2018-07-25 RX ORDER — PROPOFOL 10 MG/ML
INJECTION, EMULSION INTRAVENOUS AS NEEDED
Status: DISCONTINUED | OUTPATIENT
Start: 2018-07-25 | End: 2018-07-25 | Stop reason: HOSPADM

## 2018-07-25 RX ORDER — MIDAZOLAM HYDROCHLORIDE 1 MG/ML
INJECTION, SOLUTION INTRAMUSCULAR; INTRAVENOUS AS NEEDED
Status: DISCONTINUED | OUTPATIENT
Start: 2018-07-25 | End: 2018-07-25 | Stop reason: HOSPADM

## 2018-07-25 RX ORDER — SODIUM CHLORIDE, SODIUM LACTATE, POTASSIUM CHLORIDE, CALCIUM CHLORIDE 600; 310; 30; 20 MG/100ML; MG/100ML; MG/100ML; MG/100ML
100 INJECTION, SOLUTION INTRAVENOUS CONTINUOUS
Status: DISCONTINUED | OUTPATIENT
Start: 2018-07-25 | End: 2018-07-25 | Stop reason: HOSPADM

## 2018-07-25 RX ORDER — CEFAZOLIN SODIUM 1 G/3ML
INJECTION, POWDER, FOR SOLUTION INTRAMUSCULAR; INTRAVENOUS AS NEEDED
Status: DISCONTINUED | OUTPATIENT
Start: 2018-07-25 | End: 2018-07-25 | Stop reason: HOSPADM

## 2018-07-25 RX ORDER — LIDOCAINE HYDROCHLORIDE 20 MG/ML
INJECTION, SOLUTION EPIDURAL; INFILTRATION; INTRACAUDAL; PERINEURAL AS NEEDED
Status: DISCONTINUED | OUTPATIENT
Start: 2018-07-25 | End: 2018-07-25 | Stop reason: HOSPADM

## 2018-07-25 RX ORDER — HYDROCODONE BITARTRATE AND ACETAMINOPHEN 5; 325 MG/1; MG/1
1 TABLET ORAL
Qty: 12 TAB | Refills: 0 | Status: SHIPPED | OUTPATIENT
Start: 2018-07-25 | End: 2019-08-08

## 2018-07-25 RX ADMIN — MIDAZOLAM HYDROCHLORIDE 2 MG: 1 INJECTION, SOLUTION INTRAMUSCULAR; INTRAVENOUS at 10:47

## 2018-07-25 RX ADMIN — CEFAZOLIN SODIUM 2 G: 1 INJECTION, POWDER, FOR SOLUTION INTRAMUSCULAR; INTRAVENOUS at 10:47

## 2018-07-25 RX ADMIN — FENTANYL CITRATE 25 MCG: 50 INJECTION, SOLUTION INTRAMUSCULAR; INTRAVENOUS at 10:47

## 2018-07-25 RX ADMIN — PROPOFOL 30 MG: 10 INJECTION, EMULSION INTRAVENOUS at 11:00

## 2018-07-25 RX ADMIN — PROPOFOL 30 MG: 10 INJECTION, EMULSION INTRAVENOUS at 10:54

## 2018-07-25 RX ADMIN — SODIUM CHLORIDE, SODIUM LACTATE, POTASSIUM CHLORIDE, AND CALCIUM CHLORIDE 100 ML/HR: 600; 310; 30; 20 INJECTION, SOLUTION INTRAVENOUS at 10:01

## 2018-07-25 RX ADMIN — PROPOFOL 100 MG: 10 INJECTION, EMULSION INTRAVENOUS at 10:45

## 2018-07-25 RX ADMIN — LIDOCAINE HYDROCHLORIDE 50 MG: 20 INJECTION, SOLUTION EPIDURAL; INFILTRATION; INTRACAUDAL; PERINEURAL at 10:45

## 2018-07-25 RX ADMIN — PROPOFOL 30 MG: 10 INJECTION, EMULSION INTRAVENOUS at 10:51

## 2018-07-25 RX ADMIN — PROPOFOL 30 MG: 10 INJECTION, EMULSION INTRAVENOUS at 10:57

## 2018-07-25 RX ADMIN — PROPOFOL 30 MG: 10 INJECTION, EMULSION INTRAVENOUS at 10:48

## 2018-07-25 NOTE — IP AVS SNAPSHOT
2700 [facility] [address] [place] [address] 
[phone] Patient: Doug Lema MRN: [id number] Mckayla Simone About your hospitalization You were admitted on:  July 25, 2018 You last received care in the:  [facility] PACU You were discharged on:  July 25, 2018 Why you were hospitalized Your primary diagnosis was:  Not on File Follow-up Information Follow up With Details Comments Contact Info Eva Estrada MD In 2 weeks  6006 [facility] [address] [place] [address] 
[phone] Layla Eric MD   [address] [place] [address] 
[phone] Discharge Orders None A check ledy indicates which time of day the medication should be taken. My Medications START taking these medications Instructions Each Dose to Equal  
 Morning Noon Evening Bedtime HYDROcodone-acetaminophen 5-325 mg per tablet Commonly known as:  Charlene Agarwal Your last dose was: Your next dose is: Take 1 Tab by mouth every six (6) hours as needed for Pain. Max Daily Amount: 4 Tabs. 1 Tab CONTINUE taking these medications Instructions Each Dose to Equal  
 Morning Noon Evening Bedtime  
 albuterol 90 mcg/actuation inhaler Commonly known as:  PROVENTIL HFA, VENTOLIN HFA, PROAIR HFA Your last dose was: Your next dose is: Take 1 Puff by inhalation every four (4) hours as needed for Shortness of Breath. 1 Puff  
    
   
   
   
  
 fexofenadine 180 mg tablet Commonly known as:  Lore Britt Your last dose was: Your next dose is: Take  by mouth. fluticasone 50 mcg/actuation nasal spray Commonly known as:  Linh Medina Your last dose was: Your next dose is: 2 Sprays by Both Nostrils route daily. 2 Spray LARISSIA 0.1-20 mg-mcg Tab Generic drug:  levonorgestrel-ethinyl estradiol Your last dose was: Your next dose is:    
   
   
      
   
   
   
  
 mometasone 50 mcg/actuation nasal spray Commonly known as:  Denny Aguayod Your last dose was: Your next dose is: 2 Sprays by Both Nostrils route daily. 2 Spray ZyrTEC 10 mg Cap Generic drug:  Cetirizine Your last dose was: Your next dose is: Take  by mouth. Where to Get Your Medications Information on where to get these meds will be given to you by the nurse or doctor. ! Ask your nurse or doctor about these medications HYDROcodone-acetaminophen 5-325 mg per tablet Opioid Education Prescription Opioids: What You Need to Know: 
 
Prescription opioids can be used to help relieve moderate-to-severe pain and are often prescribed following a surgery or injury, or for certain health conditions. These medications can be an important part of treatment but also come with serious risks. Opioids are strong pain medicines. Examples include hydrocodone, oxycodone, fentanyl, and morphine. Heroin is an example of an illegal opioid. It is important to work with your health care provider to make sure you are getting the safest, most effective care. WHAT ARE THE RISKS AND SIDE EFFECTS OF OPIOID USE? Prescription opioids carry serious risks of addiction and overdose, especially with prolonged use. An opioid overdose, often marked by slow breathing, can cause sudden death. The use of prescription opioids can have a number of side effects as well, even when taken as directed. · Tolerance-meaning you might need to take more of a medication for the same pain relief · Physical dependence-meaning you have symptoms of withdrawal when the medication is stopped.   Withdrawal symptoms can include nausea, sweating, chills, diarrhea, stomach cramps, and muscle aches. Withdrawal can last up to several weeks, depending on which drug you took and how long you took it. · Increased sensitivity to pain · Constipation · Nausea, vomiting, and dry mouth · Sleepiness and dizziness · Confusion · Depression · Low levels of testosterone that can result in lower sex drive, energy, and strength · Itching and sweating RISKS ARE GREATER WITH:      
· History of drug misuse, substance use disorder, or overdose · Mental health conditions (such as depression or anxiety) · Sleep apnea · Older age (72 years or older) · Pregnancy Avoid alcohol while taking prescription opioids. Also, unless specifically advised by your health care provider, medications to avoid include: · Benzodiazepines (such as Xanax or Valium) · Muscle relaxants (such as Soma or Flexeril) · Hypnotics (such as Ambien or Lunesta) · Other prescription opioids KNOW YOUR OPTIONS Talk to your health care provider about ways to manage your pain that don't involve prescription opioids. Some of these options may actually work better and have fewer risks and side effects. Options may include: 
· Pain relievers such as acetaminophen, ibuprofen, and naproxen · Some medications that are also used for depression or seizures · Physical therapy and exercise · Counseling to help patients learn how to cope better with triggers of pain and stress. · Application of heat or cold compress · Massage therapy · Relaxation techniques Be Informed Make sure you know the name of your medication, how much and how often to take it, and its potential risks & side effects. IF YOU ARE PRESCRIBED OPIOIDS FOR PAIN: 
· Never take opioids in greater amounts or more often than prescribed. Remember the goal is not to be pain-free but to manage your pain at a tolerable level. · Follow up with your primary care provider to: · Work together to create a plan on how to manage your pain. · Talk about ways to help manage your pain that don't involve prescription opioids. · Talk about any and all concerns and side effects. · Help prevent misuse and abuse. · Never sell or share prescription opioids · Help prevent misuse and abuse. · Store prescription opioids in a secure place and out of reach of others (this may include visitors, children, friends, and family). · Safely dispose of unused/unwanted prescription opioids: Find your community drug take-back program or your pharmacy mail-back program, or flush them down the toilet, following guidance from the Food and Drug Administration (www.fda.gov/Drugs/ResourcesForYou). · Visit www.cdc.gov/drugoverdose to learn about the risks of opioid abuse and overdose. · If you believe you may be struggling with addiction, tell your health care provider and ask for guidance or call [employer] at 9-989-678-ACKR. Discharge Instructions Dr. Jessica Kearns Postoperative Instructions 1. Please maintain the dressing placed at surgery for 5 days. You may remove it in 5 days. Please keep the dressing clean and dry for 5 days. In 5 days you may get the wound wet and then cover it with a bandaid. If you have any questions or problems with your dressing, please call our office at [phone]. 
2. Please elevate the operative extremity to the level of the heart to keep swelling at a minimum. You may get up to move around, but when seated please keep the extremity elevated as much as possible. This will decrease swelling and postoperative pain. You may do activities as tolerated. 3. You may ice your arm/hand 5 times a day for 20 minutes at a time. 4. A prescription for pain medication is provided. The key to pain control is staying ahead of the pain.  For the first 48-72 hours after your surgery, you may want to take your pain medication on a regular schedule. After that, you may only need it on an as needed basis. 5. Signs and symptoms of infection include: redness, increased pain, increased swelling not relieved by elevation, drainage, fever, or chills, If you develop any of these symptoms, call the office at [phone]. 6. Please Follow-up at my office 14 days after surgery or when specified at your pre-operative appointment. DISCHARGE SUMMARY from Nurse PATIENT INSTRUCTIONS: 
 
 
F-face looks uneven A-arms unable to move or move unevenly S-speech slurred or non-existent T-time-call 911 as soon as signs and symptoms begin-DO NOT go Back to bed or wait to see if you get better-TIME IS BRAIN. Warning Signs of HEART ATTACK Call 911 if you have these symptoms: 
? Chest discomfort. Most heart attacks involve discomfort in the center of the chest that lasts more than a few minutes, or that goes away and comes back. It can feel like uncomfortable pressure, squeezing, fullness, or pain. ? Discomfort in other areas of the upper body. Symptoms can include pain or discomfort in one or both arms, the back, neck, jaw, or stomach. ? Shortness of breath with or without chest discomfort. ? Other signs may include breaking out in a cold sweat, nausea, or lightheadedness. Don't wait more than five minutes to call [address]! Fast action can save your life. Calling 911 is almost always the fastest way to get lifesaving treatment. Emergency Medical Services staff can begin treatment when they arrive  up to an hour sooner than if someone gets to the hospital by car. The discharge information has been reviewed with the patient.   The patient verbalized understanding. Discharge medications reviewed with the patient and appropriate educational materials and side effects teaching were provided. ___________________________________________________________________________________________________________________________________ Introducing [facility]! [employer] introduces [employer] patient portal. Now you can access parts of your medical record, email your doctor's office, and request medication refills online. 1. In your internet browser, go to [email or web address]"t 2. Click on the First Time User? Click Here link in the Sign In box. You will see the New Member Sign Up page. 3. Enter your Ecommo Access Code exactly as it appears below. You will not need to use this code after youve completed the sign-up process. If you do not sign up before the expiration date, you must request a new code. · Ecommo Access Code: [email or web address] Expires: 9/20/2018 10:14 AM 
 
4. Enter the last four digits of your Social Security Number (xxxx) and Date of Birth (mm/dd/yyyy) as indicated and click Submit. You will be taken to the next sign-up page. 5. Create a Ecommo ID. This will be your Ecommo login ID and cannot be changed, so think of one that is secure and easy to remember. 6. Create a Ecommo password. You can change your password at any time. 7. Enter your Password Reset Question and Answer. This can be used at a later time if you forget your password. 8. Enter your e-mail address. You will receive e-mail notification when new information is available in [facility] 9. Click Sign Up. You can now view and download portions of your medical record. 10. Click the Download Summary menu link to download a portable copy of your medical information. If you have questions, please visit the Frequently Asked Questions section of the Ecommo website.  Remember, Ecommo is NOT to be used for urgent needs. For medical emergencies, dial 911. Now available from your iPhone and Android! Introducing Christopher Gonzalez As a [facility] patient, I wanted to make you aware of our electronic visit tool called Christopher [facility]. [facility] 24/7 allows you to connect within minutes with a medical provider 24 hours a day, seven days a week via a mobile device or tablet or logging into a secure website from your computer. You can access Christopher Gonzalez from anywhere in the [place]. A virtual visit might be right for you when you have a simple condition and feel like you just dont want to get out of bed, or cant get away from work for an appointment, when your regular [facility] provider is not available (evenings, weekends or holidays), or when youre out of town and need minor care. Electronic visits cost only $49 and if the [facility] 24/7 provider determines a prescription is needed to treat your condition, one can be electronically transmitted to a nearby pharmacy*. Please take a moment to enroll today if you have not already done so. The enrollment process is free and takes just a few minutes. To enroll, please download the [facility] 24/7 matheus to your tablet or phone, or visit [email or web address] to enroll on your computer. And, as an [address] [place] [address] patient with a [employer] account, the results of your visits will be scanned into your electronic medical record and your primary care provider will be able to view the scanned results. We urge you to continue to see your regular [facility] provider for your ongoing medical care. And while your primary care provider may not be the one available when you seek a Christopher Gonzalez virtual visit, the peace of mind you get from getting a real diagnosis real time can be priceless. For more information on Christopher Gonzalez, view our Frequently Asked Questions (FAQs) at [email or web address].  
 
Sincerely, 
 
 Luis Vasquez MD 
Chief Medical Officer [employer] *:  certain medications cannot be prescribed via Christopher Gonzalez Providers Seen During Your Hospitalization Provider Specialty Primary office phone Sung Meléndez MD Orthopedic Surgery [phone] Your Primary Care Physician (PCP) Primary Care Physician Office Phone Office Fax [address] [phone] You are allergic to the following Allergen Reactions Cucumber Fruit Extract Hives Recent Documentation Height Weight BMI OB Status Smoking Status 1.626 m 68.5 kg 25.92 kg/m2 Recent pregnancy Never Smoker Emergency Contacts Name Discharge Info Relation Home Work Mobile Maleelizabeth,Erica DISCHARGE CAREGIVER [3] Parent [1] [phone] Bryon Ross CAREGIVER [3] Spouse [3] [phone] Patient Belongings The following personal items are in your possession at time of discharge: 
  Dental Appliances: None Please provide this summary of care documentation to your next provider. Signatures-by signing, you are acknowledging that this After Visit Summary has been reviewed with you and you have received a copy. Patient Signature:  ____________________________________________________________ Date:  ____________________________________________________________  
  
Patti Baca Provider Signature:  ____________________________________________________________ Date:  ____________________________________________________________

## 2018-07-25 NOTE — DISCHARGE INSTRUCTIONS
Dr. Natalia Ryder Postoperative Instructions    1. Please maintain the dressing placed at surgery for 5 days. You may remove it in 5 days. Please keep the dressing clean and dry for 5 days. In 5 days you may get the wound wet and then cover it with a bandaid. If you have any questions or problems with your dressing, please call our office at [phone].  2. Please elevate the operative extremity to the level of the heart to keep swelling at a minimum. You may get up to move around, but when seated please keep the extremity elevated as much as possible. This will decrease swelling and postoperative pain. You may do activities as tolerated. 3. You may ice your arm/hand 5 times a day for 20 minutes at a time. 4. A prescription for pain medication is provided. The key to pain control is staying ahead of the pain. For the first 48-72 hours after your surgery, you may want to take your pain medication on a regular schedule. After that, you may only need it on an as needed basis. 5. Signs and symptoms of infection include: redness, increased pain, increased swelling not relieved by elevation, drainage, fever, or chills, If you develop any of these symptoms, call the office at [phone]. 6. Please Follow-up at my office 14 days after surgery or when specified at your pre-operative appointment. DISCHARGE SUMMARY from Nurse    PATIENT INSTRUCTIONS:    After general anesthesia or intravenous sedation, for 24 hours or while taking prescription Narcotics:  · Limit your activities  · Do not drive and operate hazardous machinery  · Do not make important personal or business decisions  · Do  not drink alcoholic beverages  · If you have not urinated within 8 hours after discharge, please contact your surgeon on call.     Report the following to your surgeon:  · Excessive pain, swelling, redness or odor of or around the surgical area  · Temperature over 100.5  · Nausea and vomiting lasting longer than 4 hours or if unable to take medications  · Any signs of decreased circulation or nerve impairment to extremity: change in color, persistent  numbness, tingling, coldness or increase pain  · Any questions    What to do at Home:  Recommended activity: Activity as tolerated and no driving for today,     If you experience any of the following symptoms as aforementioned, please follow up with . *  Please give a list of your current medications to your Primary Care Provider. *  Please update this list whenever your medications are discontinued, doses are      changed, or new medications (including over-the-counter products) are added. *  Please carry medication information at all times in case of emergency situations. These are general instructions for a healthy lifestyle:    No smoking/ No tobacco products/ Avoid exposure to second hand smoke  Surgeon General's Warning:  Quitting smoking now greatly reduces serious risk to your health. Obesity, smoking, and sedentary lifestyle greatly increases your risk for illness    A healthy diet, regular physical exercise & weight monitoring are important for maintaining a healthy lifestyle    You may be retaining fluid if you have a history of heart failure or if you experience any of the following symptoms:  Weight gain of 3 pounds or more overnight or 5 pounds in a week, increased swelling in our hands or feet or shortness of breath while lying flat in bed. Please call your doctor as soon as you notice any of these symptoms; do not wait until your next office visit. Recognize signs and symptoms of STROKE:    F-face looks uneven    A-arms unable to move or move unevenly    S-speech slurred or non-existent    T-time-call 911 as soon as signs and symptoms begin-DO NOT go       Back to bed or wait to see if you get better-TIME IS BRAIN. Warning Signs of HEART ATTACK     Call 911 if you have these symptoms:   Chest discomfort.  Most heart attacks involve discomfort in the center of the chest that lasts more than a few minutes, or that goes away and comes back. It can feel like uncomfortable pressure, squeezing, fullness, or pain.  Discomfort in other areas of the upper body. Symptoms can include pain or discomfort in one or both arms, the back, neck, jaw, or stomach.  Shortness of breath with or without chest discomfort.  Other signs may include breaking out in a cold sweat, nausea, or lightheadedness. Don't wait more than five minutes to call 911 - MINUTES MATTER! Fast action can save your life. Calling 911 is almost always the fastest way to get lifesaving treatment. Emergency Medical Services staff can begin treatment when they arrive -- up to an hour sooner than if someone gets to the hospital by car. The discharge information has been reviewed with the patient. The patient verbalized understanding. Discharge medications reviewed with the patient and appropriate educational materials and side effects teaching were provided.   ___________________________________________________________________________________________________________________________________

## 2018-07-25 NOTE — INTERVAL H&P NOTE
H&P Update:  Janeth Zepeda was seen and examined. History and physical has been reviewed. The patient has been examined. There have been no significant clinical changes since the completion of the originally dated History and Physical.  She has elected for sedation.       Signed By: Pamella Vance MD     July 25, 2018 10:28 AM

## 2018-07-25 NOTE — OP NOTES
[address] [place] [address] REPORT    Dorotha Phoenix  MR#: [id number]  : 1990  ACCOUNT #: [de-identified]   DATE OF SERVICE: 2018    PREOPERATIVE DIAGNOSIS:  Mass, left small finger. POSTOPERATIVE DIAGNOSIS:  Mass, left small finger. PROCEDURE PERFORMED:  Excision of mass, left small finger. SURGEON:  Alex Carrasco MD    ASSISTANT:  None    ANESTHESIA:  Sedation. ESTIMATED BLOOD LOSS:  Minimal.    SPECIMENS REMOVED:  Mass, left small finger. IMPLANTS:  None. COMPLICATIONS:  None. INDICATIONS FOR THE PROCEDURE:  This is a 60-year-old female with a large mass on the left small finger, which appears to be a large pyogenic granuloma. She is indicated for surgical excision. We have discussed risks, benefits, potential complications, and alternatives to surgery and she has given informed consent to proceed. DESCRIPTION OF THE PROCEDURE:  The patient was identified in the preoperative holding area. Informed consent was obtained. The operative site was marked. She was then transported to the OR and placed supine on the operating table. All bony prominences were well padded. After induction of deep sedation, a digital block was performed with 0.5% Marcaine and 1% lidocaine. The left upper extremity was then sterilely prepped and draped in the usual fashion. Surgical timeout was held. The operative site was confirmed. Preoperative antibiotics were given. A digital tourniquet was then applied to the digit. She was found to have a large mass in the volar aspect of the digit overlying the middle phalanx. This measured approximately 1.5 cm in diameter. This was a fungating mass with a base, which measured approximately 0.5 cm. The mass was removed with a 15 blade and sent to Pathology. Again, the base measured about 0.5 cm. The base was then excised in an elliptical fashion, also excising skin and subcutaneous tissues in the area of the mass.   The digital nerves were identified and protected throughout the remainder of the case. The mass was completely debrided. The skin edges were then lightly cauterized with bipolar cautery. The wound was thoroughly irrigated. The skin was reapproximated with 4-0 nylon sutures. Sterile dressings were applied. The digital tourniquet was removed and brisk cap refill returned to the digits. She was transferred to the PACU in stable condition without complication.       MD NICK Ng /   D: 07/25/2018 11:11     T: 07/25/2018 11:34  JOB #: [id number]

## 2018-07-25 NOTE — ANESTHESIA POSTPROCEDURE EVALUATION
Post-Anesthesia Evaluation and Assessment    Patient: Leonidas Catherine MRN: [id number]  SSN: [id number]    YOB: 1990  Age: 33 y.o. Sex: female       Cardiovascular Function/Vital Signs  Visit Vitals    /76    Pulse 75    Temp 37.1 °C (98.7 °F)    Resp 23    Ht 5' 4\" (1.626 m)    Wt 68.5 kg (151 lb)    SpO2 97%    BMI 25.92 kg/m2       Patient is status post MAC anesthesia for Procedure(s):  Excision of Mass Left Small Finger. Nausea/Vomiting: None    Postoperative hydration reviewed and adequate. Pain:  Pain Scale 1: Numeric (0 - 10) (07/25/18 1105)  Pain Intensity 1: 0 (07/25/18 1105)   Managed    Neurological Status:   Neuro (WDL): Within Defined Limits (07/25/18 1105)  Neuro  LUE Motor Response: Purposeful (07/25/18 1105)  LLE Motor Response: Purposeful (07/25/18 1105)  RUE Motor Response: Purposeful (07/25/18 1105)  RLE Motor Response: Purposeful (07/25/18 1105)   At baseline    Mental Status and Level of Consciousness: Arousable    Pulmonary Status:   O2 Device: Room air (07/25/18 1115)   Adequate oxygenation and airway patent    Complications related to anesthesia: None    Post-anesthesia assessment completed.  No concerns    Signed By: Lola Calvert MD     July 25, 2018

## 2018-09-21 ENCOUNTER — HOSPITAL ENCOUNTER (OUTPATIENT)
Dept: LAB | Age: 28
Discharge: HOME | End: 2018-09-21

## 2018-12-19 ENCOUNTER — HOSPITAL ENCOUNTER (OUTPATIENT)
Dept: CT IMAGING | Age: 28
Discharge: HOME | End: 2018-12-19
Payer: COMMERCIAL

## 2018-12-19 DIAGNOSIS — R10.9 ABDOMINAL PAIN: ICD-10-CM

## 2018-12-19 DIAGNOSIS — R10.9 FLANK PAIN: ICD-10-CM

## 2018-12-19 PROCEDURE — 74176 CT ABD & PELVIS W/O CONTRAST: CPT

## 2019-08-08 ENCOUNTER — OFFICE VISIT (OUTPATIENT)
Dept: PRIMARY CARE CLINIC | Age: 29
End: 2019-08-08

## 2019-08-08 VITALS
OXYGEN SATURATION: 99 % | WEIGHT: 139 LBS | SYSTOLIC BLOOD PRESSURE: 118 MMHG | DIASTOLIC BLOOD PRESSURE: 80 MMHG | RESPIRATION RATE: 17 BRPM | HEIGHT: 64 IN | TEMPERATURE: 98.2 F | HEART RATE: 68 BPM | BODY MASS INDEX: 23.73 KG/M2

## 2019-08-08 DIAGNOSIS — Z00.00 WELL ADULT ON ROUTINE HEALTH CHECK: Primary | ICD-10-CM

## 2019-08-08 DIAGNOSIS — R53.83 FATIGUE, UNSPECIFIED TYPE: ICD-10-CM

## 2019-08-08 NOTE — PROGRESS NOTES
Subjective:   33 y.o. female for Well Woman Check. Her gyne and breast care is done elsewhere by her Ob-Gyne physician. Reports that she feels tired always which is her baseline. She is a toddler at home. No concerns today. She exercises regularly. Patient Active Problem List   Diagnosis Code    Sinusitis J32.9    FHx: allergies Z84.89    Elevated liver enzymes R74.8     Current Outpatient Medications   Medication Sig Dispense Refill    LARISSIA 0.1-20 mg-mcg tab        Allergies   Allergen Reactions    Cucumber Fruit Extract Hives    Hydrocodone Vertigo     Dizziness  Nausea and dizziness       Past Medical History:   Diagnosis Date    Calculus of kidney     Environmental allergies      Past Surgical History:   Procedure Laterality Date    HX UROLOGICAL      Cystoscopic kidney stone removal     Family History   Problem Relation Age of Onset    Gall Bladder Disease Mother     No Known Problems Father      Social History     Tobacco Use    Smoking status: Never Smoker    Smokeless tobacco: Never Used   Substance Use Topics    Alcohol use: No        Lab Results   Component Value Date/Time    Cholesterol, total 141 08/02/2017 02:28 PM    HDL Cholesterol 54 08/02/2017 02:28 PM    LDL, calculated 81 08/02/2017 02:28 PM    Triglyceride 31 08/02/2017 02:28 PM     Lab Results   Component Value Date/Time    ALT (SGPT) 35 (H) 08/02/2017 02:28 PM    AST (SGOT) 25 08/02/2017 02:28 PM    Alk.  phosphatase 68 08/02/2017 02:28 PM    Bilirubin, direct 0.11 11/02/2016 12:30 PM    Bilirubin, total 0.5 08/02/2017 02:28 PM    Albumin 4.5 08/02/2017 02:28 PM    Protein, total 7.0 08/02/2017 02:28 PM    PLATELET 810 18/16/9197 09:29 AM     Lab Results   Component Value Date/Time    GFR est non- 08/02/2017 02:28 PM    GFR est  08/02/2017 02:28 PM    Creatinine 0.58 08/02/2017 02:28 PM    BUN 9 08/02/2017 02:28 PM    Sodium 140 08/02/2017 02:28 PM    Potassium 4.1 08/02/2017 02:28 PM    Chloride 103 08/02/2017 02:28 PM    CO2 24 08/02/2017 02:28 PM     Lab Results   Component Value Date/Time    TSH 1.320 07/25/2016 09:29 AM    T4, Free 0.99 07/25/2016 09:29 AM      No results found for: HBA1C, QXQ9MXXI, HGBE8, TOP5LVUS, MSD4TJVP      ROS: Feeling generally well. No TIA's or unusual headaches, no dysphagia. No prolonged cough. No dyspnea or chest pain on exertion. No abdominal pain, change in bowel habits, black or bloody stools. No urinary tract symptoms. No new or unusual musculoskeletal symptoms. Specific concerns today: none. Objective: The patient appears well, alert, oriented x 3, in no distress. Visit Vitals  /80 (BP 1 Location: Left arm, BP Patient Position: Sitting)   Pulse 68   Temp 98.2 °F (36.8 °C) (Oral)   Resp 17   Ht 5' 4\" (1.626 m)   Wt 139 lb (63 kg)   LMP 07/13/2019   SpO2 99%   BMI 23.86 kg/m²     ENT normal. Mild wax in right ear. Neck supple. No adenopathy or thyromegaly. MOUSTAPHA. Lungs are clear, good air entry, no wheezes, rhonchi or rales. S1 and S2 normal, no murmurs, regular rate and rhythm. Abdomen soft without tenderness, guarding, mass or organomegaly. Extremities show no edema, normal peripheral pulses. Neurological is normal, no focal findings. Breast and Pelvic exams are deferred. Assessment/Plan:   Well Woman  follow low fat diet, follow low salt diet, continue present plan, routine labs ordered, call if any problems    ICD-10-CM ICD-9-CM    1. Well adult on routine health check Y68.52 M42.7 METABOLIC PANEL, COMPREHENSIVE      LIPID PANEL      HEMOGLOBIN A1C WITH EAG      CBC WITH AUTOMATED DIFF      THYROID CASCADE PROFILE   2. Fatigue, unspecified type R53.83 780.79 THYROID CASCADE PROFILE     Diagnoses and all orders for this visit:    1. Well adult on routine health check  -     METABOLIC PANEL, COMPREHENSIVE  -     LIPID PANEL  -     HEMOGLOBIN A1C WITH EAG  -     CBC WITH AUTOMATED DIFF  -     THYROID CASCADE PROFILE    2.  Fatigue, unspecified type  - THYROID CASCADE PROFILE      Follow-up and Dispositions    · Return in about 1 year (around 8/8/2020), or if symptoms worsen or fail to improve.        current treatment plan is effective, no change in therapy  reviewed diet, exercise and weight control

## 2019-08-09 LAB
ALBUMIN SERPL-MCNC: 4.3 G/DL (ref 3.5–5.5)
ALBUMIN/GLOB SERPL: 1.8 {RATIO} (ref 1.2–2.2)
ALP SERPL-CCNC: 66 IU/L (ref 39–117)
ALT SERPL-CCNC: 16 IU/L (ref 0–32)
AST SERPL-CCNC: 17 IU/L (ref 0–40)
BASOPHILS # BLD AUTO: 0 X10E3/UL (ref 0–0.2)
BASOPHILS NFR BLD AUTO: 0 %
BILIRUB SERPL-MCNC: 0.6 MG/DL (ref 0–1.2)
BUN SERPL-MCNC: 7 MG/DL (ref 6–20)
BUN/CREAT SERPL: 9 (ref 9–23)
CALCIUM SERPL-MCNC: 8.9 MG/DL (ref 8.7–10.2)
CHLORIDE SERPL-SCNC: 105 MMOL/L (ref 96–106)
CHOLEST SERPL-MCNC: 147 MG/DL (ref 100–199)
CO2 SERPL-SCNC: 23 MMOL/L (ref 20–29)
CREAT SERPL-MCNC: 0.82 MG/DL (ref 0.57–1)
EOSINOPHIL # BLD AUTO: 0.2 X10E3/UL (ref 0–0.4)
EOSINOPHIL NFR BLD AUTO: 3 %
ERYTHROCYTE [DISTWIDTH] IN BLOOD BY AUTOMATED COUNT: 13.6 % (ref 12.3–15.4)
EST. AVERAGE GLUCOSE BLD GHB EST-MCNC: 97 MG/DL
GLOBULIN SER CALC-MCNC: 2.4 G/DL (ref 1.5–4.5)
GLUCOSE SERPL-MCNC: 72 MG/DL (ref 65–99)
HBA1C MFR BLD: 5 % (ref 4.8–5.6)
HCT VFR BLD AUTO: 42.2 % (ref 34–46.6)
HDLC SERPL-MCNC: 44 MG/DL
HGB BLD-MCNC: 13.4 G/DL (ref 11.1–15.9)
IMM GRANULOCYTES # BLD AUTO: 0 X10E3/UL (ref 0–0.1)
IMM GRANULOCYTES NFR BLD AUTO: 0 %
LDLC SERPL CALC-MCNC: 90 MG/DL (ref 0–99)
LYMPHOCYTES # BLD AUTO: 1.8 X10E3/UL (ref 0.7–3.1)
LYMPHOCYTES NFR BLD AUTO: 29 %
MCH RBC QN AUTO: 27 PG (ref 26.6–33)
MCHC RBC AUTO-ENTMCNC: 31.8 G/DL (ref 31.5–35.7)
MCV RBC AUTO: 85 FL (ref 79–97)
MONOCYTES # BLD AUTO: 0.4 X10E3/UL (ref 0.1–0.9)
MONOCYTES NFR BLD AUTO: 7 %
NEUTROPHILS # BLD AUTO: 4 X10E3/UL (ref 1.4–7)
NEUTROPHILS NFR BLD AUTO: 61 %
PLATELET # BLD AUTO: 254 X10E3/UL (ref 150–450)
POTASSIUM SERPL-SCNC: 4.5 MMOL/L (ref 3.5–5.2)
PROT SERPL-MCNC: 6.7 G/DL (ref 6–8.5)
RBC # BLD AUTO: 4.96 X10E6/UL (ref 3.77–5.28)
SODIUM SERPL-SCNC: 141 MMOL/L (ref 134–144)
TRIGL SERPL-MCNC: 67 MG/DL (ref 0–149)
TSH SERPL DL<=0.005 MIU/L-ACNC: 1.2 UIU/ML (ref 0.45–4.5)
VLDLC SERPL CALC-MCNC: 13 MG/DL (ref 5–40)
WBC # BLD AUTO: 6.4 X10E3/UL (ref 3.4–10.8)

## 2019-08-09 NOTE — PROGRESS NOTES
Please mail letter:    CBC, kidney, liver, cholesterol, thyroid level is normal.  No diabetes. Please fax her health form.

## 2020-11-25 ENCOUNTER — OFFICE VISIT (OUTPATIENT)
Dept: PRIMARY CARE CLINIC | Age: 30
End: 2020-11-25
Payer: COMMERCIAL

## 2020-11-25 VITALS
SYSTOLIC BLOOD PRESSURE: 110 MMHG | RESPIRATION RATE: 14 BRPM | HEART RATE: 80 BPM | TEMPERATURE: 97.4 F | WEIGHT: 131.8 LBS | BODY MASS INDEX: 22.5 KG/M2 | DIASTOLIC BLOOD PRESSURE: 75 MMHG | OXYGEN SATURATION: 97 % | HEIGHT: 64 IN

## 2020-11-25 DIAGNOSIS — Z00.00 WELL ADULT ON ROUTINE HEALTH CHECK: ICD-10-CM

## 2020-11-25 DIAGNOSIS — Z00.00 WELL ADULT ON ROUTINE HEALTH CHECK: Primary | ICD-10-CM

## 2020-11-25 PROCEDURE — 99395 PREV VISIT EST AGE 18-39: CPT | Performed by: FAMILY MEDICINE

## 2020-11-25 NOTE — PROGRESS NOTES
Chief Complaint   Patient presents with    Complete Physical          1. Have you been to the ER, urgent care clinic since your last visit? yes ER at [facility] doctors for a sinus infection. Hospitalized since your last visit? no    2. Have you seen or consulted any other health care providers outside of the [address] [place] [address] since your last visit? Include any pap smears or colon screening. Yes papsmear normal last year.

## 2020-11-25 NOTE — PROGRESS NOTES
Subjective Finding:    Chief compalint: Patient presents with:  Back Pain  Neck Pain  , Pain Scale: 4/10, Intensity: dull and ache, Duration: 4 days, Change since last visit: , Radiating: no.    Date of injury:     Activities that the pain restricts:   Home/household/hobbies/social activities: no.  Work duties: no.  Sleep: no.  Makes symptoms better: rest.  Makes symptoms worse: standing long time.  Have you seen anyone else for the symptoms? No.  Work related: no.  Automobile related injury: no.    Objective and Assessment:    Posture Analysis:   High shoulder: right.  Head tilt: right.  High iliac crest: left.  Head carriage: forward.  Thoracic Kyphosis: neutral.  Lumbar Lordosis: neutral.    Lumbar Range of Motion: flexion decreased and extension decreased.  Cervical Range of Motion: left rotation decreased and right rotation decreased.  Thoracic Range of Motion: left rotation decreased and right rotation decreased.  Extremity Range of Motion: .    Palpation:   Quad lumb: bilateral, referred pain: no    Segmental dysfunction pre-treatment: C5  T6-7-8  L45.    Assessment post-treatment:  Cervical: ROM increased.  Thoracic: ROM increased.  Lumbar: ROM increased.    Comments: scoliosis.      Complicating Factors:          Plan / Procedure:    Expected release date: .  Treatment plan: PRN.  Instructed patient: stretch as instructed at visit and walk 10 minutes.  Short term goals: reduce pain.  Long term goals: restore normal function.  Prognosis: excellent.                                    Subjective:   35 y.o. female for Well Woman Check. Her gyne and breast care is done elsewhere by her Ob-Gyne physician. She has been exercising 5 days/week. She sometimes feels dizzy otherwise she has been feeling fine. Allergy has been stable with Allegra. Patient Active Problem List   Diagnosis Code    Sinusitis J32.9    FHx: allergies Z84.89    Elevated liver enzymes R74.8     Current Outpatient Medications   Medication Sig Dispense Refill    LARISSIA 0.1-20 mg-mcg tab        Allergies   Allergen Reactions    Cucumber Fruit Extract Hives    Hydrocodone Vertigo     Dizziness  Nausea and dizziness       Past Medical History:   Diagnosis Date    Calculus of kidney     Environmental allergies      Past Surgical History:   Procedure Laterality Date    HX UROLOGICAL      Cystoscopic kidney stone removal        Lab Results   Component Value Date/Time    WBC 6.4 08/08/2019 01:21 PM    HGB 13.4 08/08/2019 01:21 PM    Hemoglobin (POC) 13.5 07/25/2018 09:55 AM    HCT 42.2 08/08/2019 01:21 PM    PLATELET 976 50/20/7868 01:21 PM    MCV 85 08/08/2019 01:21 PM     Lab Results   Component Value Date/Time    Cholesterol, total 147 08/08/2019 01:21 PM    HDL Cholesterol 44 08/08/2019 01:21 PM    LDL, calculated 90 08/08/2019 01:21 PM    Triglyceride 67 08/08/2019 01:21 PM     Lab Results   Component Value Date/Time    GFR est non-AA 98 08/08/2019 01:21 PM    GFR est  08/08/2019 01:21 PM    Creatinine 0.82 08/08/2019 01:21 PM    BUN 7 08/08/2019 01:21 PM    Sodium 141 08/08/2019 01:21 PM    Potassium 4.5 08/08/2019 01:21 PM    Chloride 105 08/08/2019 01:21 PM    CO2 23 08/08/2019 01:21 PM     Lab Results   Component Value Date/Time    TSH 1.200 08/08/2019 01:21 PM    TSH 1.320 07/25/2016 09:29 AM    T4, Free 0.99 07/25/2016 09:29 AM      Lab Results   Component Value Date/Time    Hemoglobin A1c 5.0 08/08/2019 01:21 PM         ROS: Feeling generally well. No TIA's or unusual headaches, no dysphagia.   No prolonged cough. No dyspnea or chest pain on exertion. No abdominal pain, change in bowel habits, black or bloody stools. No urinary tract symptoms. No new or unusual musculoskeletal symptoms. Specific concerns today: none. Objective: The patient appears well, alert, oriented x 3, in no distress. Visit Vitals  /75   Pulse 80   Temp 97.4 °F (36.3 °C) (Temporal)   Resp 14   Ht 5' 4\" (1.626 m)   Wt 131 lb 12.8 oz (59.8 kg)   LMP 11/22/2020   SpO2 97%   BMI 22.62 kg/m²     ENT normal.  Neck supple. No adenopathy or thyromegaly. MOUSTAPHA. Lungs are clear, good air entry, no wheezes, rhonchi or rales. S1 and S2 normal, no murmurs, regular rate and rhythm. Abdomen soft without tenderness, guarding, mass or organomegaly. Extremities show no edema, normal peripheral pulses. Neurological is normal, no focal findings. Breast and Pelvic exams are deferred. Assessment/Plan:   Well Woman  follow low fat diet, continue present plan, routine labs ordered, call if any problems    ICD-10-CM ICD-9-CM    1. Well adult on routine health check  V25.35 B05.1 METABOLIC PANEL, COMPREHENSIVE      LIPID PANEL      HEMOGLOBIN A1C WITH EAG      THYROID CASCADE PROFILE      CBC WITH AUTOMATED DIFF     Diagnoses and all orders for this visit:    1. Well adult on routine health check  -     METABOLIC PANEL, COMPREHENSIVE; Future  -     LIPID PANEL; Future  -     HEMOGLOBIN A1C WITH EAG; Future  -     THYROID CASCADE PROFILE; Future  -     CBC WITH AUTOMATED DIFF; Future      Follow-up and Dispositions    · Return if symptoms worsen or fail to improve.        current treatment plan is effective, no change in therapy  reviewed diet, exercise and weight control

## 2020-11-26 LAB
ALBUMIN SERPL-MCNC: 4.3 G/DL (ref 3.5–5)
ALBUMIN/GLOB SERPL: 1.3 {RATIO} (ref 1.1–2.2)
ALP SERPL-CCNC: 110 U/L (ref 45–117)
ALT SERPL-CCNC: 38 U/L (ref 12–78)
ANION GAP SERPL CALC-SCNC: 5 MMOL/L (ref 5–15)
AST SERPL-CCNC: 17 U/L (ref 15–37)
BASOPHILS # BLD: 0.1 K/UL (ref 0–0.1)
BASOPHILS NFR BLD: 1 % (ref 0–1)
BILIRUB SERPL-MCNC: 0.7 MG/DL (ref 0.2–1)
BUN SERPL-MCNC: 8 MG/DL (ref 6–20)
BUN/CREAT SERPL: 11 (ref 12–20)
CALCIUM SERPL-MCNC: 9.4 MG/DL (ref 8.5–10.1)
CHLORIDE SERPL-SCNC: 104 MMOL/L (ref 97–108)
CHOLEST SERPL-MCNC: 168 MG/DL
CO2 SERPL-SCNC: 28 MMOL/L (ref 21–32)
CREAT SERPL-MCNC: 0.71 MG/DL (ref 0.55–1.02)
DIFFERENTIAL METHOD BLD: ABNORMAL
EOSINOPHIL # BLD: 0.3 K/UL (ref 0–0.4)
EOSINOPHIL NFR BLD: 3 % (ref 0–7)
ERYTHROCYTE [DISTWIDTH] IN BLOOD BY AUTOMATED COUNT: 13.2 % (ref 11.5–14.5)
EST. AVERAGE GLUCOSE BLD GHB EST-MCNC: 100 MG/DL
GLOBULIN SER CALC-MCNC: 3.3 G/DL (ref 2–4)
GLUCOSE SERPL-MCNC: 80 MG/DL (ref 65–100)
HBA1C MFR BLD: 5.1 % (ref 4–5.6)
HCT VFR BLD AUTO: 45.5 % (ref 35–47)
HDLC SERPL-MCNC: 60 MG/DL
HDLC SERPL: 2.8 {RATIO} (ref 0–5)
HGB BLD-MCNC: 14.3 G/DL (ref 11.5–16)
IMM GRANULOCYTES # BLD AUTO: 0 K/UL (ref 0–0.04)
IMM GRANULOCYTES NFR BLD AUTO: 0 % (ref 0–0.5)
LDLC SERPL CALC-MCNC: 95.4 MG/DL (ref 0–100)
LIPID PROFILE,FLP: NORMAL
LYMPHOCYTES # BLD: 2.7 K/UL (ref 0.8–3.5)
LYMPHOCYTES NFR BLD: 26 % (ref 12–49)
MCH RBC QN AUTO: 26.9 PG (ref 26–34)
MCHC RBC AUTO-ENTMCNC: 31.4 G/DL (ref 30–36.5)
MCV RBC AUTO: 85.5 FL (ref 80–99)
MONOCYTES # BLD: 0.5 K/UL (ref 0–1)
MONOCYTES NFR BLD: 5 % (ref 5–13)
NEUTS SEG # BLD: 6.7 K/UL (ref 1.8–8)
NEUTS SEG NFR BLD: 65 % (ref 32–75)
NRBC # BLD: 0 K/UL (ref 0–0.01)
NRBC BLD-RTO: 0 PER 100 WBC
PLATELET # BLD AUTO: 255 K/UL (ref 150–400)
PMV BLD AUTO: 11.7 FL (ref 8.9–12.9)
POTASSIUM SERPL-SCNC: 4.1 MMOL/L (ref 3.5–5.1)
PROT SERPL-MCNC: 7.6 G/DL (ref 6.4–8.2)
RBC # BLD AUTO: 5.32 M/UL (ref 3.8–5.2)
SODIUM SERPL-SCNC: 137 MMOL/L (ref 136–145)
TRIGL SERPL-MCNC: 63 MG/DL (ref ?–150)
TSH SERPL-ACNC: 1.15 UIU/ML (ref 0.45–4.5)
VLDLC SERPL CALC-MCNC: 12.6 MG/DL
WBC # BLD AUTO: 10.2 K/UL (ref 3.6–11)

## 2020-11-30 NOTE — PROGRESS NOTES
Sent via my chart. Good morning! Hope you are doing well. Your labs looks good. Kidney, liver, thyroid, complete blood count, cholesterol level came back in normal range. Blood sugar level is normal.    Thanks.     Dr. Leydi Eli

## 2020-12-08 ENCOUNTER — TELEPHONE (OUTPATIENT)
Dept: PRIMARY CARE CLINIC | Age: 30
End: 2020-12-08

## 2020-12-08 NOTE — TELEPHONE ENCOUNTER
----- Message from Dylan Biswas sent at 12/8/2020 11:35 AM EST -----  Regarding: Dr. Judith Knox Message/Vendor Calls    Caller's first and last name: pt      Reason for call: Question       Callback required yes/no and why: yes, to discuss      Best contact number(s): [phone] or leave message      Details to clarify the request: pt called concerned she might have Iron deficiency  and would like to discuss what the symptoms are.       Dylan Biswas

## 2020-12-09 NOTE — TELEPHONE ENCOUNTER
Her recent lab looks normal.   Hemoglobin level is normal.   I don't think she has iron deficiency considering her recent lab results. Symptoms of low iron is: soa, dyspnea, dizziness, palpitations headache. She has been doing exercise regularly and watching her diet very closely recently and she lost weight which may play a role. Lack of sleep also can cause fatigueness as well. Make sure to increase hydration. , okay to take  sergio Selby to help with electrolytes.

## 2020-12-10 NOTE — TELEPHONE ENCOUNTER
Discussed results and recommendations with patient about her lab results. She states she is concerned because she has been having hair loss, dizziness off and on, and states she is cold all the time. Advised patient I would talk to Dr. Scott Barrios and see what she says. She verbalized her understanding and thanked me.

## 2020-12-11 NOTE — TELEPHONE ENCOUNTER
I spoke with patient and discussed different strategies regarding her concerns. She thanked for the call.

## 2021-05-24 ENCOUNTER — OFFICE VISIT (OUTPATIENT)
Dept: PRIMARY CARE CLINIC | Age: 31
End: 2021-05-24
Payer: COMMERCIAL

## 2021-05-24 VITALS
RESPIRATION RATE: 18 BRPM | HEIGHT: 64 IN | WEIGHT: 122.4 LBS | DIASTOLIC BLOOD PRESSURE: 79 MMHG | HEART RATE: 78 BPM | SYSTOLIC BLOOD PRESSURE: 114 MMHG | TEMPERATURE: 98 F | BODY MASS INDEX: 20.9 KG/M2 | OXYGEN SATURATION: 96 %

## 2021-05-24 DIAGNOSIS — T74.11XA DOMESTIC PHYSICAL ABUSE OF ADULT: ICD-10-CM
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
## 2021-05-24 DIAGNOSIS — M26.621 ARTHRALGIA OF RIGHT TEMPOROMANDIBULAR JOINT: ICD-10-CM

## 2021-05-24 PROCEDURE — 99214 OFFICE O/P EST MOD 30 MIN: CPT | Performed by: FAMILY MEDICINE

## 2021-05-24 RX ORDER — IBUPROFEN 600 MG/1
600 TABLET ORAL 2 TIMES DAILY WITH MEALS
Qty: 20 TABLET | Refills: 0 | Status: SHIPPED | OUTPATIENT
Start: 2021-05-24 | End: 2022-09-16
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
## 2021-05-24 NOTE — PROGRESS NOTES
Room 12    Identified pt with two pt identifiers(name and ). Reviewed record in preparation for visit and have obtained necessary documentation. All patient medications has been reviewed. Chief Complaint   Patient presents with   [facility] Follow Up       3 most recent [place] Screens 2021   Little interest or pleasure in doing things Not at all   Feeling down, depressed, irritable, or hopeless More than half the days   Total Score PHQ 2 2     Abuse Screening Questionnaire 2017   Do you ever feel afraid of your partner? N   Are you in a relationship with someone who physically or mentally threatens you? N   Is it safe for you to go home? Y       Health Maintenance Due   Topic    COVID-19 Vaccine (1)    DTaP/Tdap/Td series (1 - Tdap)     Health Maintenance Review: Patient reminded of \"due or due soon\" health maintenance. I have asked the patient to contact his/her primary care provider (PCP) for follow-up on his/her health maintenance. Vitals:    21 1517   BP: 114/79   Pulse: 78   Resp: 18   Temp: 98 °F (36.7 °C)   TempSrc: Oral   SpO2: 96%   Weight: 122 lb 6.4 oz (55.5 kg)   Height: 5' 4\" (1.626 m)   PainSc:   0 - No pain   LMP: 2021       Wt Readings from Last 3 Encounters:   21 122 lb 6.4 oz (55.5 kg)   20 131 lb 12.8 oz (59.8 kg)   19 139 lb (63 kg)     Temp Readings from Last 3 Encounters:   21 98 °F (36.7 °C) (Oral)   20 97.4 °F (36.3 °C) (Temporal)   19 98.2 °F (36.8 °C) (Oral)     BP Readings from Last 3 Encounters:   21 114/79   20 110/75   19 118/80     Pulse Readings from Last 3 Encounters:   21 78   20 80   19 68       Coordination of Care Questionnaire:   1) Have you been to an emergency room, urgent care, or hospitalized since your last visit? Yes    2. Have seen or consulted any other health care provider since your last visit?    Yes Andrey Adams

## 2021-07-22 ENCOUNTER — OFFICE VISIT (OUTPATIENT)
Dept: PRIMARY CARE CLINIC | Age: 31
End: 2021-07-22
Payer: COMMERCIAL

## 2021-07-22 VITALS
BODY MASS INDEX: 20.35 KG/M2 | HEIGHT: 64 IN | SYSTOLIC BLOOD PRESSURE: 95 MMHG | HEART RATE: 74 BPM | TEMPERATURE: 97.8 F | OXYGEN SATURATION: 99 % | RESPIRATION RATE: 16 BRPM | DIASTOLIC BLOOD PRESSURE: 65 MMHG | WEIGHT: 119.2 LBS

## 2021-07-22 DIAGNOSIS — F43.9 STRESS AT HOME: Primary | ICD-10-CM

## 2021-07-22 DIAGNOSIS — F41.9 ANXIETY AND DEPRESSION: ICD-10-CM

## 2021-07-22 DIAGNOSIS — F32.A ANXIETY AND DEPRESSION: ICD-10-CM

## 2021-07-22 DIAGNOSIS — F41.0 PANIC ATTACK: ICD-10-CM

## 2021-07-22 PROCEDURE — 99214 OFFICE O/P EST MOD 30 MIN: CPT | Performed by: FAMILY MEDICINE

## 2021-07-22 RX ORDER — FEXOFENADINE HYDROCHLORIDE AND PSEUDOEPHEDRINE HYDROCHLORIDE 180; 240 MG/1; MG/1
1 TABLET, FILM COATED, EXTENDED RELEASE ORAL DAILY
COMMUNITY

## 2021-07-22 RX ORDER — PAROXETINE 10 MG/1
TABLET, FILM COATED ORAL
Qty: 30 TABLET | Refills: 1 | Status: SHIPPED | OUTPATIENT
Start: 2021-07-22 | End: 2021-08-16 | Stop reason: SDUPTHER

## 2021-07-22 NOTE — PROGRESS NOTES
Identified pt with two pt identifiers(name and ). Chief Complaint   Patient presents with    Other     wants to discuss taking time off - going through separation     Anxiety     panic attacks at work     Flank Pain     aching feeling - thinks its due to stress    Knee Pain     pt thinks she is missing something in her diet/vitamins pain in knees tried taking vitamins but that didn't help she is not sure whats missing        3 most recent PHQ Screens 2021   Little interest or pleasure in doing things Not at all   Feeling down, depressed, irritable, or hopeless More than half the days   Total Score PHQ 2 2        Vitals:    21 1519   BP: 95/65   Pulse: 74   Resp: 16   Temp: 97.8 °F (36.6 °C)   TempSrc: Temporal   SpO2: 99%   Weight: 119 lb 3.2 oz (54.1 kg)   Height: 5' 4\" (1.626 m)   PainSc:   0 - No pain   LMP: 2021       Health Maintenance Due   Topic    DTaP/Tdap/Td series (1 - Tdap)       1. Have you been to the ER, urgent care clinic since your last visit? Hospitalized since your last visit? No    2. Have you seen or consulted any other health care providers outside of the [address] [place] [address] since your last visit? Include any pap smears or colon screening.  No
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
## 2021-08-02 ENCOUNTER — OFFICE VISIT (OUTPATIENT)
Dept: PRIMARY CARE CLINIC | Age: 31
End: 2021-08-02
Payer: COMMERCIAL

## 2021-08-02 ENCOUNTER — PATIENT MESSAGE (OUTPATIENT)
Dept: PRIMARY CARE CLINIC | Age: 31
End: 2021-08-02

## 2021-08-02 VITALS
RESPIRATION RATE: 16 BRPM | HEART RATE: 69 BPM | DIASTOLIC BLOOD PRESSURE: 60 MMHG | WEIGHT: 120.4 LBS | BODY MASS INDEX: 20.55 KG/M2 | OXYGEN SATURATION: 99 % | SYSTOLIC BLOOD PRESSURE: 92 MMHG | TEMPERATURE: 97.8 F | HEIGHT: 64 IN

## 2021-08-02 DIAGNOSIS — F43.9 STRESS AT HOME: Primary | ICD-10-CM

## 2021-08-02 DIAGNOSIS — F41.9 ANXIETY AND DEPRESSION: ICD-10-CM

## 2021-08-02 DIAGNOSIS — F41.0 PANIC ATTACK: ICD-10-CM

## 2021-08-02 DIAGNOSIS — F32.A ANXIETY AND DEPRESSION: ICD-10-CM

## 2021-08-02 DIAGNOSIS — Z02.89 ENCOUNTER FOR COMPLETION OF FORM WITH PATIENT: ICD-10-CM

## 2021-08-02 PROCEDURE — 99214 OFFICE O/P EST MOD 30 MIN: CPT | Performed by: FAMILY MEDICINE

## 2021-08-02 NOTE — PROGRESS NOTES
Identified pt with two pt identifiers(name and ). Chief Complaint   Patient presents with    Form Completion        3 most recent PHQ Screens 2021   Little interest or pleasure in doing things Not at all   Feeling down, depressed, irritable, or hopeless More than half the days   Total Score PHQ 2 2        Vitals:    21 1140   Resp: 16   Weight: 120 lb 6.4 oz (54.6 kg)   Height: 5' 4\" (1.626 m)       Health Maintenance Due   Topic    DTaP/Tdap/Td series (1 - Tdap)       1. Have you been to the ER, urgent care clinic since your last visit? Hospitalized since your last visit? No    2. Have you seen or consulted any other health care providers outside of the [address] [place] [address] since your last visit? Include any pap smears or colon screening.  No
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
## 2021-08-03 ENCOUNTER — TELEPHONE (OUTPATIENT)
Dept: PRIMARY CARE CLINIC | Age: 31
End: 2021-08-03

## 2021-08-03 NOTE — TELEPHONE ENCOUNTER
Called patient regarding her forms   Need to know how often she's going to therapy ( how many times a week or a month ) and when is her next appointment date

## 2021-08-19 ENCOUNTER — TELEPHONE (OUTPATIENT)
Dept: PRIMARY CARE CLINIC | Age: 31
End: 2021-08-19

## 2021-08-19 NOTE — TELEPHONE ENCOUNTER
Patient is on short term disability and needs to talk to someone about it expiring. She was trying to book an earlier appointment and I told her there was nothing available.

## 2021-08-19 NOTE — TELEPHONE ENCOUNTER
Pt states when she takes the Paxil she gets a massive headache - pt states she is taking it as needed not everyday. Pt also states she would like to get an extension through end of September but states Time away from work will need office notes explaining and asking for an extension.

## 2021-08-23 ENCOUNTER — TELEPHONE (OUTPATIENT)
Dept: PRIMARY CARE CLINIC | Age: 31
End: 2021-08-23

## 2021-08-23 NOTE — TELEPHONE ENCOUNTER
----- Message from Umesh Arias sent at 8/23/2021  9:21 AM EDT -----  Regarding: Dr. Lilly Kaplan  Patient return call    Caller's first and last name and relationship (if not the patient): pt       Best contact number(s): [phone]      Whose call is being returned: Shabnam Melendez      Details to clarify the request: Returning call in regards to subject discussed last week.        Umesh Arias

## 2021-08-27 ENCOUNTER — OFFICE VISIT (OUTPATIENT)
Dept: PRIMARY CARE CLINIC | Age: 31
End: 2021-08-27
Payer: COMMERCIAL

## 2021-08-27 ENCOUNTER — TELEPHONE (OUTPATIENT)
Dept: PRIMARY CARE CLINIC | Age: 31
End: 2021-08-27

## 2021-08-27 VITALS
BODY MASS INDEX: 21.21 KG/M2 | SYSTOLIC BLOOD PRESSURE: 104 MMHG | HEART RATE: 72 BPM | DIASTOLIC BLOOD PRESSURE: 70 MMHG | WEIGHT: 124.2 LBS | HEIGHT: 64 IN | TEMPERATURE: 97.8 F | OXYGEN SATURATION: 98 % | RESPIRATION RATE: 18 BRPM

## 2021-08-27 DIAGNOSIS — F43.9 STRESS AT HOME: ICD-10-CM

## 2021-08-27 DIAGNOSIS — F41.9 ANXIETY AND DEPRESSION: Primary | ICD-10-CM

## 2021-08-27 DIAGNOSIS — Z02.89 ENCOUNTER FOR COMPLETION OF FORM WITH PATIENT: ICD-10-CM

## 2021-08-27 DIAGNOSIS — F32.A ANXIETY AND DEPRESSION: Primary | ICD-10-CM

## 2021-08-27 DIAGNOSIS — F41.8 SITUATIONAL ANXIETY: ICD-10-CM

## 2021-08-27 PROCEDURE — 99214 OFFICE O/P EST MOD 30 MIN: CPT | Performed by: FAMILY MEDICINE

## 2021-08-27 RX ORDER — SERTRALINE HYDROCHLORIDE 50 MG/1
50 TABLET, FILM COATED ORAL DAILY
Qty: 30 TABLET | Refills: 1 | Status: SHIPPED | OUTPATIENT
Start: 2021-08-27 | End: 2021-09-27 | Stop reason: SDUPTHER
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
## 2021-08-27 NOTE — LETTER
NOTIFICATION RETURN TO WORK / SCHOOL    8/27/2021 12:18 PM    Ms. Nupur Smith  [phone]      To Whom It May Concern:    Nupur Smith is currently under the care of Valentin Nieves. She will return to work on 10/1/2021    If there are questions or concerns please have the patient contact our office.         Sincerely,      Ghada Dennis MD

## 2021-08-27 NOTE — TELEPHONE ENCOUNTER
Patient called regarding extension of leave. [facility] is sending paperwork for the extension today. The Pt asked if we can send the paper work and notes from the visit ASAP because she is suppose to return to work next week. The Pt also requested that when everything is sent can someone give her a call.

## 2021-08-27 NOTE — TELEPHONE ENCOUNTER
----- Message from Brittny Middleton sent at 8/27/2021 12:56 PM EDT -----  Regarding: Dr. Dorota Kaur first and last name: n/a      Reason for call: Paperwork for [employer]. Callback required yes/no and why: no, if needed      Best contact number(s): [phone]      Details to clarify the request: Patient stated that [employer] is requesting the patient's office visit in order to approve the patient's paperwork.       Brittny Middleton

## 2021-08-27 NOTE — PROGRESS NOTES
Chief Complaint   Patient presents with    Medication Evaluation       Health Maintenance Due   Topic    DTaP/Tdap/Td series (1 - Tdap)        1. Have you been to the ER, urgent care clinic since your last visit? Hospitalized since your last visit? No    2. Have you seen or consulted any other health care providers outside of the [address] [place] [address] since your last visit? Include any pap smears or colon screening.  No    Visit Vitals  Ht 5' 4\" (1.626 m)   Wt 124 lb 3.2 oz (56.3 kg)   LMP 07/28/2021 (Exact Date)   BMI 21.32 kg/m²

## 2021-09-27 DIAGNOSIS — F41.9 ANXIETY AND DEPRESSION: ICD-10-CM

## 2021-09-27 DIAGNOSIS — F43.9 STRESS AT HOME: ICD-10-CM

## 2021-09-27 DIAGNOSIS — F32.A ANXIETY AND DEPRESSION: ICD-10-CM

## 2021-09-30 RX ORDER — SERTRALINE HYDROCHLORIDE 50 MG/1
50 TABLET, FILM COATED ORAL DAILY
Qty: 30 TABLET | Refills: 1 | Status: SHIPPED | OUTPATIENT
Start: 2021-09-30 | End: 2021-10-13 | Stop reason: SDUPTHER

## 2021-10-13 ENCOUNTER — OFFICE VISIT (OUTPATIENT)
Dept: PRIMARY CARE CLINIC | Age: 31
End: 2021-10-13
Payer: COMMERCIAL

## 2021-10-13 VITALS
DIASTOLIC BLOOD PRESSURE: 67 MMHG | RESPIRATION RATE: 16 BRPM | SYSTOLIC BLOOD PRESSURE: 101 MMHG | TEMPERATURE: 97.6 F | HEART RATE: 78 BPM | OXYGEN SATURATION: 98 % | BODY MASS INDEX: 22.64 KG/M2 | HEIGHT: 64 IN | WEIGHT: 132.6 LBS

## 2021-10-13 DIAGNOSIS — F41.9 ANXIETY AND DEPRESSION: ICD-10-CM

## 2021-10-13 DIAGNOSIS — F32.A ANXIETY AND DEPRESSION: ICD-10-CM

## 2021-10-13 DIAGNOSIS — F43.9 STRESS AT HOME: ICD-10-CM

## 2021-10-13 PROCEDURE — 99213 OFFICE O/P EST LOW 20 MIN: CPT | Performed by: FAMILY MEDICINE

## 2021-10-13 RX ORDER — SERTRALINE HYDROCHLORIDE 50 MG/1
50 TABLET, FILM COATED ORAL DAILY
Qty: 90 TABLET | Refills: 1 | Status: SHIPPED | OUTPATIENT
Start: 2021-10-13 | End: 2022-04-18 | Stop reason: SDUPTHER

## 2021-10-13 NOTE — PROGRESS NOTES
Identified pt with two pt identifiers(name and ). Chief Complaint   Patient presents with    Anxiety        3 most recent PHQ Screens 2021   Little interest or pleasure in doing things Not at all   Feeling down, depressed, irritable, or hopeless Several days   Total Score PHQ 2 1        Vitals:    10/13/21 1616   BP: 101/67   Pulse: 78   Resp: 16   Temp: 97.6 °F (36.4 °C)   TempSrc: Temporal   SpO2: 98%   Weight: 132 lb 9.6 oz (60.1 kg)   Height: 5' 4\" (1.626 m)   PainSc:   0 - No pain   LMP: 2021       Health Maintenance Due   Topic    DTaP/Tdap/Td series (1 - Tdap)    Flu Vaccine (1)       1. Have you been to the ER, urgent care clinic since your last visit? Hospitalized since your last visit? No    2. Have you seen or consulted any other health care providers outside of the [address] [place] [address] since your last visit? Include any pap smears or colon screening.  No

## 2021-10-13 NOTE — PROGRESS NOTES
Subjective:     Chief Complaint   Patient presents with    Anxiety        She  is a 28y.o. year old female who presents today for follow up on anxiety and depression. She reports that she has been feeling lot better. She is back to work. She does not feel any depressed anymore. Has been compliant with Zoloft 50 mg. No side effects. Pertinent items are noted in HPI. Objective:     Vitals:    10/13/21 1616   BP: 101/67   Pulse: 78   Resp: 16   Temp: 97.6 °F (36.4 °C)   TempSrc: Temporal   SpO2: 98%   Weight: 132 lb 9.6 oz (60.1 kg)   Height: 5' 4\" (1.626 m)       Physical Examination: General appearance - alert, well appearing, and in no distress, oriented to person, place, and time and normal appearing weight  Mental status - alert, oriented to person, place, and time, normal mood, behavior, speech, dress, motor activity, and thought processes  Chest - clear to auscultation, no wheezes, rales or rhonchi, symmetric air entry  Heart - normal rate, regular rhythm, normal S1, S2, no murmurs, rubs, clicks or gallops    Allergies   Allergen Reactions    Cucumber Fruit Extract Hives    Hydrocodone Vertigo     Dizziness  Nausea and dizziness        Social History     Socioeconomic History    Marital status:      Spouse name: Not on file    Number of children: Not on file    Years of education: Not on file    Highest education level: Not on file   Tobacco Use    Smoking status: Never Smoker    Smokeless tobacco: Never Used   Vaping Use    Vaping Use: Never used   Substance and Sexual Activity    Alcohol use: No    Drug use: Never    Sexual activity: Yes     Partners: Male     Birth control/protection: Pill     Social Determinants of Health     Financial Resource Strain:     Difficulty of Paying Living Expenses:    Food Insecurity:     Worried About Running Out of Food in the Last Year:     Ran Out of Food in the Last Year:    Transportation Needs:     Lack of Transportation (Medical):      Lack of Transportation (Non-Medical):    Physical Activity:     Days of Exercise per Week:     Minutes of Exercise per Session:    Stress:     Feeling of Stress :    Social Connections:     Frequency of Communication with Friends and Family:     Frequency of Social Gatherings with Friends and Family:     Attends Episcopalian Services:     Active Member of Clubs or Organizations:     Attends Club or Organization Meetings:     Marital Status:       Family History   Problem Relation Age of Onset    Gall Bladder Disease Mother     No Known Problems Father       Past Surgical History:   Procedure Laterality Date    HX UROLOGICAL      Cystoscopic kidney stone removal      Past Medical History:   Diagnosis Date    Calculus of kidney     Environmental allergies       Current Outpatient Medications   Medication Sig Dispense Refill    sertraline (ZOLOFT) 50 mg tablet Take 1 Tablet by mouth daily. 30 Tablet 1    fexofenadine-pseudoephedrine (Allegra-D 24 Hour) 180-240 mg per tablet Take 1 Tablet by mouth daily.  LARISSIA 0.1-20 mg-mcg tab       ibuprofen (MOTRIN) 600 mg tablet Take 1 Tablet by mouth two (2) times daily (with meals). (Patient not taking: Reported on 7/22/2021) 20 Tablet 0        Assessment/ Plan:   Diagnoses and all orders for this visit:    1. Anxiety and depression  -   Well controlled with sertraline (ZOLOFT) 50 mg tablet; Take 1 Tablet by mouth daily. 2. Stress at home  -     sertraline (ZOLOFT) 50 mg tablet; Take 1 Tablet by mouth daily. Medication risks/benefits/costs/interactions/alternatives discussed with patient. Advised patient to call back or return to office if symptoms worsen/change/persist. If patient cannot reach us or should anything more severe/urgent arise he/she should proceed directly to the nearest emergency department. Discussed expected course/resolution/complications of diagnosis in detail with patient.   Patient given a written after visit summary which includes her diagnoses, current medications and vitals. Patient expressed understanding with the diagnosis and plan. Follow-up and Dispositions    · Return in about 4 months (around 2/13/2022), or if symptoms worsen or fail to improve, for anxiety, depression.

## 2022-04-18 DIAGNOSIS — F32.A ANXIETY AND DEPRESSION: ICD-10-CM

## 2022-04-18 DIAGNOSIS — F43.9 STRESS AT HOME: ICD-10-CM

## 2022-04-18 DIAGNOSIS — F41.9 ANXIETY AND DEPRESSION: ICD-10-CM

## 2022-04-19 NOTE — TELEPHONE ENCOUNTER
Med refill request - please have patient schedule appt with new provider (send message back after appt is made)

## 2022-04-20 RX ORDER — SERTRALINE HYDROCHLORIDE 50 MG/1
50 TABLET, FILM COATED ORAL DAILY
Qty: 30 TABLET | Refills: 0 | Status: SHIPPED | OUTPATIENT
Start: 2022-04-20 | End: 2022-11-03

## 2022-04-20 NOTE — TELEPHONE ENCOUNTER
Called patient to schedule a new to provider appointment and she said she will try to fine a doctor closer to her home.

## 2022-09-15 ENCOUNTER — TELEPHONE (OUTPATIENT)
Dept: PRIMARY CARE CLINIC | Age: 32
End: 2022-09-15

## 2022-09-15 NOTE — TELEPHONE ENCOUNTER
----- Message from Maribel Garcia sent at 9/14/2022 11:54 AM EDT -----  Subject: Medication Problem    Medication: sertraline (ZOLOFT) 50 mg tablet  Dosage: 50MG once daily   Ordering Provider: itzel    Question/Problem: Patient doesn't feel like this medication is helping her   and she wants to know if she can try wellbutrin instead.    Additional Information for Provider:     Pharmacy: [facility]/PHARMACY #[id number] Cogan, [address]    ---------------------------------------------------------------------------  --------------  [address] [employer]  [phone]; OK to leave message on voicemail  ---------------------------------------------------------------------------  --------------    SCRIPT ANSWERS  Relationship to Patient: Self
Returned call to patient.    No answer  Left voice message to return call
standing/walking

## 2022-09-16 ENCOUNTER — OFFICE VISIT (OUTPATIENT)
Dept: PRIMARY CARE CLINIC | Age: 32
End: 2022-09-16
Payer: COMMERCIAL

## 2022-09-16 VITALS
SYSTOLIC BLOOD PRESSURE: 97 MMHG | BODY MASS INDEX: 26.87 KG/M2 | RESPIRATION RATE: 16 BRPM | WEIGHT: 157.4 LBS | DIASTOLIC BLOOD PRESSURE: 60 MMHG | HEIGHT: 64 IN | OXYGEN SATURATION: 99 % | HEART RATE: 75 BPM | TEMPERATURE: 97.8 F

## 2022-09-16 DIAGNOSIS — Z83.3 FAMILY HISTORY OF DIABETES MELLITUS: ICD-10-CM

## 2022-09-16 DIAGNOSIS — R74.8 ELEVATED LIVER ENZYMES: ICD-10-CM

## 2022-09-16 DIAGNOSIS — M79.672 PAIN OF LEFT HEEL: ICD-10-CM

## 2022-09-16 DIAGNOSIS — F41.1 GENERALIZED ANXIETY DISORDER: ICD-10-CM

## 2022-09-16 DIAGNOSIS — Z13.220 SCREENING FOR HYPERLIPIDEMIA: Primary | ICD-10-CM

## 2022-09-16 PROCEDURE — 99214 OFFICE O/P EST MOD 30 MIN: CPT | Performed by: FAMILY MEDICINE

## 2022-09-16 RX ORDER — CETIRIZINE HYDROCHLORIDE 10 MG/1
CAPSULE, LIQUID FILLED ORAL
COMMUNITY

## 2022-09-16 RX ORDER — FLUOXETINE 10 MG/1
10 CAPSULE ORAL DAILY
Qty: 30 CAPSULE | Refills: 1 | Status: SHIPPED | OUTPATIENT
Start: 2022-09-16

## 2022-09-16 NOTE — PROGRESS NOTES
Identified pt with two pt identifiers(name and ). No chief complaint on file. 3 most recent PHQ Screens 2021   Little interest or pleasure in doing things Not at all   Feeling down, depressed, irritable, or hopeless Several days   Total Score PHQ 2 1        There were no vitals filed for this visit. Health Maintenance Due   Topic Date Due    DTaP/Tdap/Td series (1 - Tdap) Never done    COVID-19 Vaccine (3 - Booster for Pfizer series) 2021    Cervical cancer screen  2021    Flu Vaccine (1) Never done    Depression Monitoring  2022        1. Have you been to the ER, urgent care clinic since your last visit? Hospitalized since your last visit? No    2. Have you seen or consulted any other health care providers outside of the [address] [place] [address] since your last visit? Include any pap smears or colon screening. No    3. For patients aged 39-70: Has the patient had a colonoscopy / FIT/ Cologuard? NA - based on age      If the patient is female:    4. For patients aged 41-77: Has the patient had a mammogram within the past 2 years? NA - based on age or sex      11. For patients aged 21-65: Has the patient had a pap smear? Yes - Care Gap present.  Rooming MA/LPN to request most recent results

## 2022-09-16 NOTE — PROGRESS NOTES
HPI     Chief Complaint   Patient presents with    Establish Care    Medication Management    Weight Gain     She is a 33 y.o. female who presents to establish care. Hx anxiety. Has been on zoloft 75mg per day for one month and doesn't feel this is significantly helping her mood. Still feels anxious most days, crying some days. She does not feel she has anhedonia or depressed mood. She uses caffeine, drinks 3 soda per day usually. Admits to unhealthy diet. Exercising regularly. Enjoys exercise. No exertional problems. Has suppportive family relationships. Lives at home with her  and young son. Marital relationship has been stressful, but is improved. She works in IT, works from home. Also c/o left plantar heel pain, worst with standing. Present first thing in the morning. Onset a few weeks ago. Establishing Care  - Chronic medical problems:  Past Medical History:   Diagnosis Date    Calculus of kidney     Environmental allergies      - Current medications:   Current Outpatient Medications   Medication Sig    Cetirizine (ZyrTEC) 10 mg cap Take  by mouth. sertraline (ZOLOFT) 50 mg tablet Take 1 Tablet by mouth daily. fexofenadine-pseudoephedrine (Allegra-D 24 Hour) 180-240 mg per tablet Take 1 Tablet by mouth daily. LARISSIA 0.1-20 mg-mcg tab     ibuprofen (MOTRIN) 600 mg tablet Take 1 Tablet by mouth two (2) times daily (with meals). (Patient not taking: No sig reported)     No current facility-administered medications for this visit. - Family history:   Family History   Problem Relation Age of Onset    Gall Bladder Disease Mother     No Known Problems Father      - Allergies:    Allergies   Allergen Reactions    Cucumber Fruit Extract Hives    Hydrocodone Vertigo     Dizziness  Nausea and dizziness       - Surgical history:   Past Surgical History:   Procedure Laterality Date    HX UROLOGICAL      Cystoscopic kidney stone removal     - Social history (sexually active, occupation, smoker, etoh use, etc):   Social History     Socioeconomic History    Marital status:      Spouse name: Not on file    Number of children: Not on file    Years of education: Not on file    Highest education level: Not on file   Occupational History    Not on file   Tobacco Use    Smoking status: Never    Smokeless tobacco: Never   Vaping Use    Vaping Use: Never used   Substance and Sexual Activity    Alcohol use: No    Drug use: Never    Sexual activity: Yes     Partners: Male     Birth control/protection: Pill   Other Topics Concern    Not on file   Social History Narrative    Not on file     Social Determinants of Health     Financial Resource Strain: Medium Risk    Difficulty of Paying Living Expenses: Somewhat hard   Food Insecurity: No Food Insecurity    Worried About Running Out of Food in the Last Year: Never true    Ran Out of Food in the Last Year: Never true   Transportation Needs: Not on file   Physical Activity: Not on file   Stress: Not on file   Social Connections: Not on file   Intimate Partner Violence: Not on file   Housing Stability: Not on file         Review of Systems  Denies fever, chills, chest pain, shortness of breath, abd pain, nausea, vomiting    Reviewed PmHx, RxHx, FmHx, SocHx, AllgHx and updated and dated in the chart. Physical Exam:  Visit Vitals  BP 97/60 (BP 1 Location: Left upper arm, BP Patient Position: Sitting, BP Cuff Size: Small adult)   Pulse 75   Temp 97.8 °F (36.6 °C) (Temporal)   Resp 16   Ht 5' 4\" (1.626 m)   Wt 157 lb 6.4 oz (71.4 kg)   LMP 08/25/2022 (Exact Date)   SpO2 99%   BMI 27.02 kg/m²     Physical Exam  Vitals reviewed. Constitutional:       Appearance: Normal appearance. HENT:      Head: Atraumatic. Eyes:      Conjunctiva/sclera: Conjunctivae normal.      Pupils: Pupils are equal, round, and reactive to light. Cardiovascular:      Rate and Rhythm: Normal rate and regular rhythm. Pulses: Normal pulses.       Heart sounds: Normal heart sounds. Pulmonary:      Effort: Pulmonary effort is normal.      Breath sounds: Normal breath sounds. Musculoskeletal:      Left lower leg: No edema. Comments: Left foot exam: some tenderness on plantar distal heel. Skin intact. Structure appears normal.    Skin:     General: Skin is warm and dry. Neurological:      General: No focal deficit present. Mental Status: She is alert and oriented to person, place, and time. Psychiatric:         Mood and Affect: Mood normal.         Behavior: Behavior normal.         Thought Content: Thought content normal.         Judgment: Judgment normal.      Comments: Speech normal. No psychomotor agitation or retardation. {       Assessment / Plan     Diagnoses and all orders for this visit:    1. Screening for hyperlipidemia  -     CBC WITH AUTOMATED DIFF; Future  -     TSH 3RD GENERATION; Future  -     METABOLIC PANEL, BASIC; Future  -     METABOLIC PANEL, COMPREHENSIVE; Future    2. Elevated liver enzymes  -     CBC WITH AUTOMATED DIFF; Future  -     TSH 3RD GENERATION; Future  -     METABOLIC PANEL, BASIC; Future  -     METABOLIC PANEL, COMPREHENSIVE; Future    3. Family history of diabetes mellitus  -     METABOLIC PANEL, BASIC; Future  -     METABOLIC PANEL, COMPREHENSIVE; Future    4. Generalized anxiety disorder : advised her to resume seeing a counselor. Encouraged social support, stress reduction, regular exercise. Try change zoloft to prozac. Discussed med use and common SE. Follow up or seek care if your mood worsens. Follow up one month in clinic.   -     FLUoxetine (PROzac) 10 mg capsule; Take 1 Capsule by mouth daily.  -     CBC WITH AUTOMATED DIFF; Future  -     TSH 3RD GENERATION; Future    5. Pain of left heel : consistent with plantar fasciitis. Start icing nightly, supportive shoes, exercises (handout provided) and follow up if not improved within 2 weeks.      I have discussed the diagnosis with the patient and the intended plan as seen in the above orders. I have discussed medication side effects and warnings with the patient as well.     Deniz Morrow, DO

## 2022-11-03 ENCOUNTER — OFFICE VISIT (OUTPATIENT)
Dept: PRIMARY CARE CLINIC | Age: 32
End: 2022-11-03
Payer: COMMERCIAL

## 2022-11-03 VITALS
OXYGEN SATURATION: 97 % | TEMPERATURE: 97.5 F | HEART RATE: 69 BPM | BODY MASS INDEX: 26.73 KG/M2 | HEIGHT: 64 IN | RESPIRATION RATE: 14 BRPM | WEIGHT: 156.6 LBS | SYSTOLIC BLOOD PRESSURE: 108 MMHG | DIASTOLIC BLOOD PRESSURE: 71 MMHG

## 2022-11-03 DIAGNOSIS — F41.8 SITUATIONAL ANXIETY: ICD-10-CM

## 2022-11-03 DIAGNOSIS — F41.1 GENERALIZED ANXIETY DISORDER: Primary | ICD-10-CM

## 2022-11-03 PROCEDURE — 99213 OFFICE O/P EST LOW 20 MIN: CPT | Performed by: FAMILY MEDICINE

## 2022-11-03 RX ORDER — ESCITALOPRAM OXALATE 10 MG/1
10 TABLET ORAL DAILY
Qty: 30 TABLET | Refills: 2 | Status: SHIPPED | OUTPATIENT
Start: 2022-11-03

## 2022-11-03 NOTE — PROGRESS NOTES
Chief Complaint   Patient presents with    Medication Evaluation     Visit Vitals  /71 (BP 1 Location: Left upper arm, BP Patient Position: Sitting, BP Cuff Size: Small adult)   Pulse 69   Temp 97.5 °F (36.4 °C) (Temporal)   Resp 14   Ht 5' 4\" (1.626 m)   Wt 156 lb 9.6 oz (71 kg)   SpO2 97%   BMI 26.88 kg/m²

## 2022-11-03 NOTE — PROGRESS NOTES
HPI     Chief Complaint   Patient presents with    Medication Evaluation     She is a 33 y.o. female who presents to follow up. Pmhx : Gen anxiety. 9/16/22 started prozac, stopped zoloft. Feeling more sad and low motivation. Feeling irritable. Recently she has been increasingly stressed due to marital conflict. She has good social support, supportive family. She states home environment is safe. She denies thoughts of self harm. - Chronic medical problems:  Past Medical History:   Diagnosis Date    Calculus of kidney     Environmental allergies      - Current medications:   Current Outpatient Medications   Medication Sig    Cetirizine (ZyrTEC) 10 mg cap Take  by mouth. FLUoxetine (PROzac) 10 mg capsule Take 1 Capsule by mouth daily. fexofenadine-pseudoephedrine (Allegra-D 24 Hour) 180-240 mg per tablet Take 1 Tablet by mouth daily. LARISSIA 0.1-20 mg-mcg tab      No current facility-administered medications for this visit. - Family history:   Family History   Problem Relation Age of Onset    Gall Bladder Disease Mother     No Known Problems Father      - Allergies:    Allergies   Allergen Reactions    Cucumber Fruit Extract Hives    Hydrocodone Vertigo     Dizziness  Nausea and dizziness       - Surgical history:   Past Surgical History:   Procedure Laterality Date    HX UROLOGICAL      Cystoscopic kidney stone removal     - Social history (sexually active, occupation, smoker, etoh use, etc):   Social History     Socioeconomic History    Marital status:      Spouse name: Not on file    Number of children: Not on file    Years of education: Not on file    Highest education level: Not on file   Occupational History    Not on file   Tobacco Use    Smoking status: Never    Smokeless tobacco: Never   Vaping Use    Vaping Use: Never used   Substance and Sexual Activity    Alcohol use: No    Drug use: Never    Sexual activity: Yes     Partners: Male     Birth control/protection: Pill Other Topics Concern    Not on file   Social History Narrative    Not on file     Social Determinants of Health     Financial Resource Strain: Medium Risk    Difficulty of Paying Living Expenses: Somewhat hard   Food Insecurity: No Food Insecurity    Worried About Running Out of Food in the Last Year: Never true    Ran Out of Food in the Last Year: Never true   Transportation Needs: Not on file   Physical Activity: Not on file   Stress: Not on file   Social Connections: Not on file   Intimate Partner Violence: Not on file   Housing Stability: Not on file         Reviewed PmHx, RxHx, FmHx, SocHx, AllgHx and updated and dated in the chart. Physical Exam:  Visit Vitals  /71 (BP 1 Location: Left upper arm, BP Patient Position: Sitting, BP Cuff Size: Small adult)   Pulse 69   Temp 97.5 °F (36.4 °C) (Temporal)   Resp 14   Ht 5' 4\" (1.626 m)   Wt 156 lb 9.6 oz (71 kg)   SpO2 97%   BMI 26.88 kg/m²     Physical Exam  Vitals reviewed. Constitutional:       Appearance: Normal appearance. Neurological:      General: No focal deficit present. Mental Status: She is alert and oriented to person, place, and time. Psychiatric:         Behavior: Behavior normal.         Thought Content: Thought content normal.         Judgment: Judgment normal.      Comments: Tearful at times. No psychomotor agitation or retardation. Good eye contact. Speech is normal.             Assessment / Plan     Diagnoses and all orders for this visit:    1. Generalized anxiety disorder    2. Situational anxiety    Other orders  -     escitalopram oxalate (LEXAPRO) 10 mg tablet; Take 1 Tablet by mouth daily. Try stopping prozac and start lexapro. Encouraged social support, regular exercise. Advised her to see a counselor. Follow up 4 weeks or sooner prn. I have discussed the diagnosis with the patient and the intended plan as seen in the above orders.     I have discussed medication side effects and warnings with the patient as well.     Aundrea William, DO

## 2022-11-07 ENCOUNTER — PATIENT MESSAGE (OUTPATIENT)
Dept: PRIMARY CARE CLINIC | Age: 32
End: 2022-11-07

## 2022-11-07 DIAGNOSIS — R74.8 ELEVATED LIVER ENZYMES: Primary | ICD-10-CM

## 2022-11-07 DIAGNOSIS — F41.9 ANXIETY AND DEPRESSION: ICD-10-CM

## 2022-11-07 DIAGNOSIS — F32.A ANXIETY AND DEPRESSION: ICD-10-CM

## 2022-11-15 NOTE — TELEPHONE ENCOUNTER
Annie Eagle LPN 19/58/7319 9:68 AM EST    Pt would like Vit D and iron added to labs  ----- Message -----  From: Yamila Fan MD  Sent: 11/15/2022 8:30 AM EST  To: Zheng Hernández LPN  Subject: FW: Emelia Brothers my patient  ----- Message -----  From: Annie Eagle LPN  Sent: 84/14/0339 8:23 AM EST  To: Kaley Hahn MD  Subject: FW: Labs      Pt requesting Vit. D level checked and Iron as well.   ----- Message -----  From: Costa Alex  Sent: 11/15/2022 8:18 AM EST  To: [facility]  Subject: Labs     Hi, was everything extra added like Iron, Vitamin D , ect.  I wanted to check everything     Thanks!!

## 2022-11-29 ENCOUNTER — OFFICE VISIT (OUTPATIENT)
Dept: PRIMARY CARE CLINIC | Age: 32
End: 2022-11-29
Payer: COMMERCIAL

## 2022-11-29 VITALS
OXYGEN SATURATION: 97 % | HEART RATE: 72 BPM | TEMPERATURE: 97.5 F | RESPIRATION RATE: 16 BRPM | SYSTOLIC BLOOD PRESSURE: 106 MMHG | WEIGHT: 159.6 LBS | DIASTOLIC BLOOD PRESSURE: 73 MMHG | HEIGHT: 64 IN | BODY MASS INDEX: 27.25 KG/M2

## 2022-11-29 DIAGNOSIS — F32.A ANXIETY AND DEPRESSION: ICD-10-CM

## 2022-11-29 DIAGNOSIS — R53.83 FATIGUE, UNSPECIFIED TYPE: Primary | ICD-10-CM

## 2022-11-29 DIAGNOSIS — R53.83 FATIGUE, UNSPECIFIED TYPE: ICD-10-CM

## 2022-11-29 DIAGNOSIS — F41.9 ANXIETY AND DEPRESSION: ICD-10-CM

## 2022-11-29 PROCEDURE — 99213 OFFICE O/P EST LOW 20 MIN: CPT | Performed by: FAMILY MEDICINE

## 2022-11-29 NOTE — PROGRESS NOTES
Chief Complaint   Patient presents with    Form Completion    Cough     At night     Visit Vitals  /73 (BP 1 Location: Right upper arm, BP Patient Position: Sitting, BP Cuff Size: Small adult)   Pulse 72   Temp 97.5 °F (36.4 °C) (Temporal)   Resp 16   Ht 5' 4\" (1.626 m)   Wt 159 lb 9.6 oz (72.4 kg)   SpO2 97%   BMI 27.40 kg/m²

## 2022-11-29 NOTE — PROGRESS NOTES
Subjective  Tess Lake is an 33 y.o. female who presents for follow up in anxiety. Chronic anxiety and depression, exacerbated by life stressors. Seeing Davon Rivera for counseling. Stressors include tumultuous marriage,  in an alcoholic. They are undergoing marriage counseling and he is planning to see a therapist in the next week. She has good social support, supportive family. She has not been exercising recently, due to low motivation. Feeling irritable and anxious. Triggered by marital conflicts. She feels medication and therapy is helping. These problems have been causing difficulty function at work. Difficulty with concentrating, completing tasks and interpersonal work relationships. She would like to take some time off to decrease her stress. She denies thoughts of self harm or suicidal ideation. Allergies - reviewed: Allergies   Allergen Reactions    Cucumber Fruit Extract Hives    Hydrocodone Vertigo     Dizziness  Nausea and dizziness           Medications - reviewed:   Current Outpatient Medications   Medication Sig    escitalopram oxalate (LEXAPRO) 10 mg tablet Take 1 Tablet by mouth daily. Cetirizine (ZyrTEC) 10 mg cap Take  by mouth. fexofenadine-pseudoephedrine (Allegra-D 24 Hour) 180-240 mg per tablet Take 1 Tablet by mouth daily. LARISSIA 0.1-20 mg-mcg tab      No current facility-administered medications for this visit.          Past Medical History - reviewed:  Past Medical History:   Diagnosis Date    Calculus of kidney     Environmental allergies          Past Surgical History - reviewed:   Past Surgical History:   Procedure Laterality Date    HX UROLOGICAL      Cystoscopic kidney stone removal         Social History - reviewed:  Social History     Socioeconomic History    Marital status:      Spouse name: Not on file    Number of children: Not on file    Years of education: Not on file    Highest education level: Not on file   Occupational History    Not on file   Tobacco Use    Smoking status: Never    Smokeless tobacco: Never   Vaping Use    Vaping Use: Never used   Substance and Sexual Activity    Alcohol use: No    Drug use: Never    Sexual activity: Yes     Partners: Male     Birth control/protection: Pill   Other Topics Concern    Not on file   Social History Narrative    Not on file     Social Determinants of Health     Financial Resource Strain: Medium Risk    Difficulty of Paying Living Expenses: Somewhat hard   Food Insecurity: No Food Insecurity    Worried About Running Out of Food in the Last Year: Never true    Ran Out of Food in the Last Year: Never true   Transportation Needs: Not on file   Physical Activity: Not on file   Stress: Not on file   Social Connections: Not on file   Intimate Partner Violence: Not on file   Housing Stability: Not on file         Family History - reviewed:  Family History   Problem Relation Age of Onset    Gall Bladder Disease Mother     No Known Problems Father          ROS  CONSTITUTIONAL: Denies fever, chills, unintentional weight loss. CARDIOVASCULAR: Denies chest pain, orthopnea, PND. RESPIRATORY: Denies cough, dyspnea, wheezing, hemoptysis. GI: Denies abdominal pain, diarrhea, constipation, diarrhea, black or bloody stool. Physical Exam  Visit Vitals  /73 (BP 1 Location: Right upper arm, BP Patient Position: Sitting, BP Cuff Size: Small adult)   Pulse 72   Temp 97.5 °F (36.4 °C) (Temporal)   Resp 16   Ht 5' 4\" (1.626 m)   Wt 159 lb 9.6 oz (72.4 kg)   SpO2 97%   BMI 27.40 kg/m²     Physical Exam  Vitals reviewed. Constitutional:       Appearance: Normal appearance. Eyes:      Conjunctiva/sclera: Conjunctivae normal.      Pupils: Pupils are equal, round, and reactive to light. Skin:     General: Skin is warm and dry. Neurological:      General: No focal deficit present. Mental Status: She is alert and oriented to person, place, and time.    Psychiatric:         Behavior: Behavior normal.         Thought Content: Thought content normal.         Judgment: Judgment normal.      Comments: Tearful at times. No psychomotor agitation or retardation. Speech is normal. Good eye contact. Assessment/Plan    ICD-10-CM ICD-9-CM    1. Fatigue, unspecified type  R53.83 780.79 CBC WITH AUTOMATED DIFF      VITAMIN D, 25 HYDROXY      2. Anxiety and depression  F41.9 300.00     F32. A 311       Labs and follow up as indicated. Encouraged stress reduction, social support, continue with counseling therapy. May take time off work, see scanned document. May try increasing lexapro dose to 20mg daily. Follow up within the next 4 weeks. Seek care if your mood worsens in any way. I have discussed the diagnosis with the patient and the intended plan as seen in the above orders. Patient verbalized understanding of the plan and agrees with the plan. I have discussed medication side effects and warnings with the patient as well. Informed patient to return to the office if new symptoms arise.         Waldo Razo, DO

## 2022-11-30 LAB
25(OH)D3 SERPL-MCNC: 15.2 NG/ML (ref 30–100)
BASOPHILS # BLD: 0.1 K/UL (ref 0–0.1)
BASOPHILS NFR BLD: 1 % (ref 0–1)
DIFFERENTIAL METHOD BLD: ABNORMAL
EOSINOPHIL # BLD: 0.6 K/UL (ref 0–0.4)
EOSINOPHIL NFR BLD: 6 % (ref 0–7)
ERYTHROCYTE [DISTWIDTH] IN BLOOD BY AUTOMATED COUNT: 13.2 % (ref 11.5–14.5)
HCT VFR BLD AUTO: 42.1 % (ref 35–47)
HGB BLD-MCNC: 13.5 G/DL (ref 11.5–16)
IMM GRANULOCYTES # BLD AUTO: 0 K/UL (ref 0–0.04)
IMM GRANULOCYTES NFR BLD AUTO: 0 % (ref 0–0.5)
LYMPHOCYTES # BLD: 2.7 K/UL (ref 0.8–3.5)
LYMPHOCYTES NFR BLD: 27 % (ref 12–49)
MCH RBC QN AUTO: 27.7 PG (ref 26–34)
MCHC RBC AUTO-ENTMCNC: 32.1 G/DL (ref 30–36.5)
MCV RBC AUTO: 86.3 FL (ref 80–99)
MONOCYTES # BLD: 0.7 K/UL (ref 0–1)
MONOCYTES NFR BLD: 7 % (ref 5–13)
NEUTS SEG # BLD: 6 K/UL (ref 1.8–8)
NEUTS SEG NFR BLD: 59 % (ref 32–75)
NRBC # BLD: 0 K/UL (ref 0–0.01)
NRBC BLD-RTO: 0 PER 100 WBC
PLATELET # BLD AUTO: 264 K/UL (ref 150–400)
PMV BLD AUTO: 11.3 FL (ref 8.9–12.9)
RBC # BLD AUTO: 4.88 M/UL (ref 3.8–5.2)
WBC # BLD AUTO: 10 K/UL (ref 3.6–11)

## 2022-12-01 RX ORDER — ERGOCALCIFEROL 1.25 MG/1
50000 CAPSULE ORAL
Qty: 8 CAPSULE | Refills: 0 | Status: SHIPPED | OUTPATIENT
Start: 2022-12-01

## 2022-12-05 RX ORDER — ESCITALOPRAM OXALATE 10 MG/1
10 TABLET ORAL DAILY
Qty: 30 TABLET | Refills: 2 | Status: SHIPPED | OUTPATIENT
Start: 2022-12-05

## 2022-12-30 ENCOUNTER — VIRTUAL VISIT (OUTPATIENT)
Dept: PRIMARY CARE CLINIC | Age: 32
End: 2022-12-30
Payer: COMMERCIAL

## 2022-12-30 DIAGNOSIS — F41.9 ANXIETY AND DEPRESSION: ICD-10-CM

## 2022-12-30 DIAGNOSIS — F32.A ANXIETY AND DEPRESSION: ICD-10-CM

## 2022-12-30 DIAGNOSIS — J01.00 ACUTE NON-RECURRENT MAXILLARY SINUSITIS: Primary | ICD-10-CM

## 2022-12-30 PROCEDURE — 99214 OFFICE O/P EST MOD 30 MIN: CPT | Performed by: FAMILY MEDICINE

## 2022-12-30 RX ORDER — BENZONATATE 200 MG/1
200 CAPSULE ORAL
Qty: 30 CAPSULE | Refills: 0 | Status: SHIPPED | OUTPATIENT
Start: 2022-12-30 | End: 2023-01-06

## 2022-12-30 RX ORDER — ERGOCALCIFEROL 1.25 MG/1
50000 CAPSULE ORAL
Qty: 4 CAPSULE | Refills: 0 | Status: SHIPPED | OUTPATIENT
Start: 2022-12-30

## 2022-12-30 RX ORDER — AMOXICILLIN AND CLAVULANATE POTASSIUM 875; 125 MG/1; MG/1
1 TABLET, FILM COATED ORAL 2 TIMES DAILY
Qty: 20 TABLET | Refills: 0 | Status: SHIPPED | OUTPATIENT
Start: 2022-12-30 | End: 2023-01-09

## 2022-12-30 NOTE — PROGRESS NOTES
Chief Complaint   Patient presents with    Medication Refill    Form Completion     Doctors notes sent to [facility] [phone]    Cold Symptoms     1. \"Have you been to the ER, urgent care clinic since your last visit? Hospitalized since your last visit? \" no    2. \"Have you seen or consulted any other health care providers outside of the [address] [place] [address] since your last visit? \" no

## 2022-12-30 NOTE — PROGRESS NOTES
Reynold Montenegro (: 1990) is a 33 y.o. female, established patient, here for evaluation of the following chief complaint(s):   Medication Refill, Form Completion (Doctors notes sent to [facility] [id number]), and Cold Symptoms       ASSESSMENT/PLAN:  Below is the assessment and plan developed based on review of pertinent history, labs, studies, and medications. 1. Acute non-recurrent maxillary sinusitis : advised expected course, supportive care. -     amoxicillin-clavulanate (AUGMENTIN) 875-125 mg per tablet; Take 1 Tablet by mouth two (2) times a day for 10 days. , Normal, Disp-20 Tablet, R-0  2. Anxiety and depression : Stable but not markedly improved. Cont lexapro 20mg daily. Cont with therapy. OOW until 22. Follow up 4 weeks or sooner prn. SUBJECTIVE/OBJECTIVE:  HPI    C/o cough x 1 week, worse at night. Using otc cough suppressants. Rhinorrhea, congestion. Purulent nasal discharge and productive. No chest pain or dyspnea. No GI complaints. Symptoms not improving. Rapid covid test was negative at home. Regarding her mood. This has been somewhat improved. She is not feeling depressed. She is still feeling quite a bit anxious. She is seeing a therapist weekly. She increased her lexapro about 1 week go to 20 mg per day. Review of Systems   No exertional chest pain or dyspnea. No gi complaints. No fever or weight loss. Current Outpatient Medications:     amoxicillin-clavulanate (AUGMENTIN) 875-125 mg per tablet, Take 1 Tablet by mouth two (2) times a day for 10 days. , Disp: 20 Tablet, Rfl: 0    benzonatate (TESSALON) 200 mg capsule, Take 1 Capsule by mouth three (3) times daily as needed for Cough for up to 7 days. , Disp: 30 Capsule, Rfl: 0    ergocalciferol (ERGOCALCIFEROL) 1,250 mcg (50,000 unit) capsule, Take 1 Capsule by mouth every seven (7) days. , Disp: 4 Capsule, Rfl: 0    escitalopram oxalate (LEXAPRO) 10 mg tablet, Take 1 Tablet by mouth daily. , Disp: 30 Tablet, Rfl: 2    Cetirizine (ZyrTEC) 10 mg cap, Take  by mouth., Disp: , Rfl:     fexofenadine-pseudoephedrine (Allegra-D 24 Hour) 180-240 mg per tablet, Take 1 Tablet by mouth daily. , Disp: , Rfl:     LARISSIA 0.1-20 mg-mcg tab, , Disp: , Rfl:         Physical Exam    Constitutional: [x] Appears well-developed and well-nourished [x] No apparent distress      [] Abnormal -     Mental status: [x] Alert and awake  [x] Oriented to person/place/time [x] Able to follow commands    [] Abnormal -     Eyes:   EOM    [x]  Normal    [] Abnormal -   Sclera  [x]  Normal    [] Abnormal -          Discharge [x]  None visible   [] Abnormal -     HENT: [x] Normocephalic, atraumatic  [] Abnormal -   [x] Mouth/Throat: Mucous membranes are moist    External Ears [x] Normal  [] Abnormal -    Neck: [x] No visualized mass [] Abnormal -     Pulmonary/Chest: [x] Respiratory effort normal   [x] No visualized signs of difficulty breathing or respiratory distress        [] Abnormal -     Neurological:        [x] No Facial Asymmetry (Cranial nerve 7 motor function) (limited exam due to video visit)          [x] No gaze palsy        [] Abnormal -          Skin:        [x] No significant exanthematous lesions or discoloration noted on facial skin         [] Abnormal -            Psychiatric:       [x] Normal Affect [] Abnormal -        [x] No Hallucinations    Early Condon, was evaluated through a synchronous (real-time) audio-video encounter. The patient (or guardian if applicable) is aware that this is a billable service, which includes applicable co-pays. This Virtual Visit was conducted with patient's (and/or legal guardian's) consent. The visit was conducted pursuant to the emergency declaration under the [address] [place] [address], [address] [place] [address] authority and the [employer] and [employer] Act. Patient identification was verified, and a caregiver was present when appropriate.   The patient was located at: Home: [address]  The provider was located at: Home: [unfilled]       An electronic signature was used to authenticate this note.   -- Helena Miranda, DO

## 2023-01-12 ENCOUNTER — PATIENT MESSAGE (OUTPATIENT)
Dept: PRIMARY CARE CLINIC | Age: 33
End: 2023-01-12

## 2023-01-12 RX ORDER — ESCITALOPRAM OXALATE 10 MG/1
10 TABLET ORAL DAILY
Qty: 90 TABLET | Refills: 0 | Status: SHIPPED | OUTPATIENT
Start: 2023-01-12

## 2023-01-19 RX ORDER — ERGOCALCIFEROL 1.25 MG/1
CAPSULE ORAL
Qty: 4 CAPSULE | Refills: 0 | Status: SHIPPED | OUTPATIENT
Start: 2023-01-19

## 2023-01-20 ENCOUNTER — VIRTUAL VISIT (OUTPATIENT)
Dept: PRIMARY CARE CLINIC | Age: 33
End: 2023-01-20
Payer: COMMERCIAL

## 2023-01-20 DIAGNOSIS — F32.A ANXIETY AND DEPRESSION: Primary | ICD-10-CM

## 2023-01-20 DIAGNOSIS — J06.9 VIRAL UPPER RESPIRATORY TRACT INFECTION: ICD-10-CM

## 2023-01-20 DIAGNOSIS — F41.9 ANXIETY AND DEPRESSION: Primary | ICD-10-CM

## 2023-01-20 RX ORDER — BUPROPION HYDROCHLORIDE 150 MG/1
150 TABLET ORAL DAILY
Qty: 30 TABLET | Refills: 0 | Status: SHIPPED | OUTPATIENT
Start: 2023-01-20

## 2023-01-20 RX ORDER — AMOXICILLIN AND CLAVULANATE POTASSIUM 875; 125 MG/1; MG/1
1 TABLET, FILM COATED ORAL EVERY 12 HOURS
Qty: 14 TABLET | Refills: 0 | Status: SHIPPED | OUTPATIENT
Start: 2023-01-20 | End: 2023-01-27

## 2023-01-20 NOTE — PROGRESS NOTES
Bre Graff (: 1990) is a 33 y.o. female, established patient, here for evaluation of the following chief complaint(s): Anxiety and Sinus Pain       ASSESSMENT/PLAN:  Below is the assessment and plan developed based on review of pertinent history, labs, studies, and medications. 1. Anxiety and depression  -     buPROPion XL (WELLBUTRIN XL) 150 mg tablet; Take 1 Tablet by mouth daily. , Normal, Disp-30 Tablet, R-0  2. Viral upper respiratory tract infection  Discussed expected course, supportive care. Given rx for abx to hold, take INI 7 days. Seek care if condition worsens. Encouraged her to continue seeing her counselor, regular exercise, stress reduction. May wean off lexapro and start wellbutrin. Use and common SE discussed. Follow up or seek care if your mood worsens in any way. Follow up within 4 weeks. No follow-ups on file. SUBJECTIVE/OBJECTIVE:  HPI    Patient c/o URI symptoms, started yesterday. C/o sinus congestion, pressure, discharge. No fever. No chest pain or dyspnea. Son at home has URI, tested negative for covid and flu. Chronic anxiety and depression. She is seeing a counselor and this seems to be helping. She is taking lexapro but does not like the way it makes her feel. She is also gaining weight which makes her feel more down. Home relationships are going better. She denies thoughts of self harm. She has been exercising at the gym. She does feel sad and irritable most days. Has tried zoloft, prozac and lexapro without satisfactory response. She denies hx of seizure. Review of Systems   ROS otherwise negative. No GI complaints. No chest pain or dyspnea. No fevers. Physical Exam  Speech is normal.      Bre Graff, was evaluated through an audio encounter as we were unable to connect with a video encounter due to technical difficulties.  The patient (or guardian if applicable) is aware that this is a billable service, which includes applicable co-pays. This Virtual Visit was conducted with patient's (and/or legal guardian's) consent. The visit was conducted pursuant to the emergency declaration under the [facility], [address] [place] [address] authority and the [employer] and [employer] Act. Patient identification was verified, and a caregiver was present when appropriate. The patient was located at: Home: [facility] [address]  The provider was located at: Home: [address] [place], [place] [address]d was used to authenticate this note.   -- Graham Rothman, DO

## 2023-01-20 NOTE — PROGRESS NOTES
Chief Complaint   Patient presents with    Anxiety    Sinus Pain     1. \"Have you been to the ER, urgent care clinic since your last visit? Hospitalized since your last visit? \" no    2. \"Have you seen or consulted any other health care providers outside of the [address] [place] [address] since your last visit? \" no

## 2023-02-13 DIAGNOSIS — F32.A ANXIETY AND DEPRESSION: ICD-10-CM

## 2023-02-13 DIAGNOSIS — F41.9 ANXIETY AND DEPRESSION: ICD-10-CM

## 2023-02-14 RX ORDER — BUPROPION HYDROCHLORIDE 150 MG/1
150 TABLET ORAL DAILY
Qty: 90 TABLET | Refills: 0 | Status: SHIPPED | OUTPATIENT
Start: 2023-02-14 | End: 2023-05-15

## 2023-02-15 RX ORDER — ESCITALOPRAM OXALATE 10 MG/1
10 TABLET ORAL DAILY
Qty: 90 TABLET | Refills: 0 | Status: SHIPPED | OUTPATIENT
Start: 2023-02-15

## 2023-03-15 ENCOUNTER — VIRTUAL VISIT (OUTPATIENT)
Dept: PRIMARY CARE CLINIC | Age: 33
End: 2023-03-15
Payer: COMMERCIAL

## 2023-03-15 DIAGNOSIS — U07.1 COVID-19: Primary | ICD-10-CM

## 2023-03-15 PROCEDURE — 99213 OFFICE O/P EST LOW 20 MIN: CPT | Performed by: FAMILY MEDICINE

## 2023-03-15 RX ORDER — BENZONATATE 100 MG/1
100 CAPSULE ORAL
Qty: 15 CAPSULE | Refills: 0 | Status: SHIPPED | OUTPATIENT
Start: 2023-03-15

## 2023-03-15 NOTE — PROGRESS NOTES
Chief Complaint   Patient presents with    Positive For Covid-19     Chills, chest tightness, headaches, sneezing, body aches, congestion. Test pos on 3/14/2023         Identified pt with two pt identifiers(name and ). Chief Complaint   Patient presents with    Positive For Covid-19     Chills, chest tightness, headaches, sneezing, body aches, congestion. Test pos on 3/14/2023         3 most recent PHQ Screens 2022   Little interest or pleasure in doing things Several days   Feeling down, depressed, irritable, or hopeless Nearly every day   Total Score PHQ 2 4   Trouble falling or staying asleep, or sleeping too much Not at all   Feeling tired or having little energy Nearly every day   Poor appetite, weight loss, or overeating Not at all   Feeling bad about yourself - or that you are a failure or have let yourself or your family down More than half the days   Trouble concentrating on things such as school, work, reading, or watching TV Several days   Moving or speaking so slowly that other people could have noticed; or the opposite being so fidgety that others notice Not at all   Thoughts of being better off dead, or hurting yourself in some way Not at all   PHQ 9 Score 10   How difficult have these problems made it for you to do your work, take care of your home and get along with others Somewhat difficult        There were no vitals filed for this visit. Health Maintenance Due   Topic Date Due    DTaP/Tdap/Td series (1 - Tdap) Never done    COVID-19 Vaccine (3 - Booster for Pfizer series) 2021    Cervical cancer screen  2022    Flu Vaccine (1) Never done        1. Have you been to the ER, urgent care clinic since your last visit? Hospitalized since your last visit? Yes [facility], allergic reaction. 2. Have you seen or consulted any other health care providers outside of the [address] [place] [address] since your last visit? Include any pap smears or colon screening. No    3.  For patients aged 39-70: Has the patient had a colonoscopy / FIT/ Cologuard? NA - based on age      If the patient is female:    4. For patients aged 41-77: Has the patient had a mammogram within the past 2 years? NA - based on age or sex      11. For patients aged 21-65: Has the patient had a pap smear?  Yes - no Care Gap present

## 2023-03-15 NOTE — PROGRESS NOTES
Hilda Chino  33 y.o. female  1990  [address] [place] [address]  [id number]   [facility]   Telemedicine Progress Note  Edgar Salas DO       Encounter Date and Time: March 15, 2023 at 9:24 AM    Consent: Hilda Chino, who was seen by synchronous (real-time) audio-video technology, and/or her healthcare decision maker, is aware that this patient-initiated, Telehealth encounter on 3/15/2023 is a billable service, with coverage as determined by her insurance carrier. She is aware that she may receive a bill and has provided verbal consent to proceed: Yes. Chief Complaint   Patient presents with    Positive For Covid-19     Chills, chest tightness, headaches, sneezing, body aches, congestion. Test pos on 3/14/2023      History of Present Illness   Hilda Chino is a 33 y.o. female was evaluated by synchronous (real-time) audio-video technology from home, through a secure patient portal.    Symptoms started on Monday afternoon. Had some chest tightness on Monday. Yesterday she had chills, aches, cold symptoms. She tested positive for COVID 19 yesterday evening. She has been taking Mucinex night time and using a nose spray. She denies any current chest pain, shortness of breath or cardiac symptoms. Review of Systems   Review of Systems   Constitutional:  Positive for chills. Respiratory:  Positive for cough. Negative for shortness of breath. Cardiovascular:  Negative for chest pain. Musculoskeletal:  Positive for myalgias.      Vitals/Objective:     General: alert, cooperative, no distress   Mental  status: mental status: alert, oriented to person, place, and time, normal mood, behavior, speech, dress, motor activity, and thought processes   Resp: resp: normal effort and no respiratory distress   Neuro: neuro: no gross deficits   Skin: skin: no discoloration or lesions of concern on visible areas   Due to this being a TeleHealth evaluation, many elements of the physical examination are unable to be assessed. Assessment and Plan:   Time-based coding, delete if not needed: I spent at least 10 minutes with this established patient, and >50% of the time was spent counseling and/or coordinating care regarding COVID 19.    1. COVID-19  Symptomatic care discussed. She does not meet criteria for Paxlovid at this time after going over her hx with her. Quarantine precautions given based on most current CDC recommendations. ER precautions include chest pain, shortness of breath, lethargy, confusion, decreased PO intake/ UOP. Follow up if symptoms are not improving.    - benzonatate (TESSALON) 100 mg capsule; Take 1 Capsule by mouth three (3) times daily as needed for Cough. Dispense: 15 Capsule; Refill: 0    Time spent in direct conversation with the patient to include medical condition(s) discussed, assessment and treatment plan: 15 min    We discussed the expected course, resolution and complications of the diagnosis(es) in detail. Medication risks, benefits, costs, interactions, and alternatives were discussed as indicated. I advised her to contact the office if her condition worsens, changes or fails to improve as anticipated. She expressed understanding with the diagnosis(es) and plan. Patient understands that this encounter was a temporary measure, and the importance of further follow up and examination was emphasized. Patient verbalized understanding. Electronically Signed: DO Roxana Bosch Gustavo is a 33 y.o. female who was evaluated by an audio-video encounter for concerns as above. Patient identification was verified prior to start of the visit. A caregiver was present when appropriate. Due to this being a TeleHealth encounter (During [facility]- public health emergency), evaluation of the following organ systems was limited: Vitals/Constitutional/EENT/Resp/CV/GI//MS/Neuro/Skin/Heme-Lymph-Imm.   Pursuant to the emergency declaration under the [facility] [address] waiver authority and the [employer] Act, this Virtual Visit was conducted, with patient's (and/or legal guardian's) consent, to reduce the patient's risk of exposure to COVID-19 and provide necessary medical care. Services were provided through a synchronous discussion virtually to substitute for in-person clinic visit. I was in the office. The patient was at home. History   Patients past medical, surgical and family histories were reviewed and updated.       Past Medical History:   Diagnosis Date    Calculus of kidney     Environmental allergies      Past Surgical History:   Procedure Laterality Date    HX UROLOGICAL      Cystoscopic kidney stone removal     Family History   Problem Relation Age of Onset    Gall Bladder Disease Mother     No Known Problems Father      Social History     Socioeconomic History    Marital status:      Spouse name: Not on file    Number of children: Not on file    Years of education: Not on file    Highest education level: Not on file   Occupational History    Not on file   Tobacco Use    Smoking status: Never    Smokeless tobacco: Never   Vaping Use    Vaping Use: Never used   Substance and Sexual Activity    Alcohol use: No    Drug use: Never    Sexual activity: Yes     Partners: Male     Birth control/protection: Pill   Other Topics Concern    Not on file   Social History Narrative    Not on file     Social Determinants of Health     Financial Resource Strain: Medium Risk    Difficulty of Paying Living Expenses: Somewhat hard   Food Insecurity: No Food Insecurity    Worried About Running Out of Food in the Last Year: Never true    Ran Out of Food in the Last Year: Never true   Transportation Needs: Not on file   Physical Activity: Not on file   Stress: Not on file   Social Connections: Not on file   Intimate Partner Violence: Not on file   Housing Stability: Not on file     Patient Active Problem List   Diagnosis Code    Sinusitis J32.9    FHx: allergies Z84.89    Elevated liver enzymes R74.8    Anxiety and depression F41.9, F32. A          Current Medications/Allergies   Medications and Allergies reviewed:    Current Outpatient Medications   Medication Sig Dispense Refill    benzonatate (TESSALON) 100 mg capsule Take 1 Capsule by mouth three (3) times daily as needed for Cough. 15 Capsule 0    Cetirizine (ZyrTEC) 10 mg cap Take  by mouth. fexofenadine-pseudoephedrine (Allegra-D 24 Hour) 180-240 mg per tablet Take 1 Tablet by mouth daily.       LARISSIA 0.1-20 mg-mcg tab        Allergies   Allergen Reactions    Cucumber Fruit Extract Hives    Hydrocodone Vertigo     Dizziness  Nausea and dizziness

## 2023-04-28 ENCOUNTER — OFFICE VISIT (OUTPATIENT)
Dept: PRIMARY CARE CLINIC | Age: 33
End: 2023-04-28

## 2023-04-28 VITALS
HEIGHT: 64 IN | HEART RATE: 74 BPM | DIASTOLIC BLOOD PRESSURE: 68 MMHG | BODY MASS INDEX: 27.21 KG/M2 | RESPIRATION RATE: 16 BRPM | WEIGHT: 159.4 LBS | SYSTOLIC BLOOD PRESSURE: 100 MMHG | TEMPERATURE: 97.5 F

## 2023-04-28 DIAGNOSIS — F41.9 ANXIETY AND DEPRESSION: ICD-10-CM

## 2023-04-28 DIAGNOSIS — N92.1 METRORRHAGIA: ICD-10-CM

## 2023-04-28 DIAGNOSIS — Z00.00 WELL WOMAN EXAM (NO GYNECOLOGICAL EXAM): Primary | ICD-10-CM

## 2023-04-28 DIAGNOSIS — Z11.3 SCREEN FOR SEXUALLY TRANSMITTED DISEASES: ICD-10-CM

## 2023-04-28 DIAGNOSIS — F32.A ANXIETY AND DEPRESSION: ICD-10-CM

## 2023-04-28 RX ORDER — SERTRALINE HYDROCHLORIDE 50 MG/1
50 TABLET, FILM COATED ORAL DAILY
Qty: 30 TABLET | Refills: 0 | Status: SHIPPED | OUTPATIENT
Start: 2023-04-28

## 2023-04-28 NOTE — PROGRESS NOTES
Subjective:   Angely Khan is an 33 y.o. female who presents for complete physical exam.    Pmhx : anxiety and depression. Vit D def. She is utd on pap smears. Follows with gyn. Diet is doing well. She stays active, but no exercising regularly. Mood has been a little down. Feeling anxious. Denies thoughts of self harm. Sleeping ok. Recently got a puppy and feels this has been helping her mood. Relationships at home are going well. Stopped Wellbutrin about 3 weeks ago because she didn't feel this was helping her mood. Stopped ocp 2 mos ago. Since that time has had irregular vaginal spotting. Allergies - reviewed: Allergies   Allergen Reactions    Cucumber Fruit Extract Hives    Hydrocodone Vertigo     Dizziness  Nausea and dizziness           Medications - reviewed:  Current Outpatient Medications   Medication Sig    benzonatate (TESSALON) 100 mg capsule Take 1 Capsule by mouth three (3) times daily as needed for Cough. Cetirizine (ZyrTEC) 10 mg cap Take  by mouth. fexofenadine-pseudoephedrine (Allegra-D 24 Hour) 180-240 mg per tablet Take 1 Tablet by mouth daily. LARISSIA 0.1-20 mg-mcg tab      No current facility-administered medications for this visit.          Past Medical History - reviewed:  Past Medical History:   Diagnosis Date    Calculus of kidney     Environmental allergies          Past Surgical History - reviewed:  Past Surgical History:   Procedure Laterality Date    HX UROLOGICAL      Cystoscopic kidney stone removal         Family History - reviewed:  Family History   Problem Relation Age of Onset    Gall Bladder Disease Mother     No Known Problems Father          Social History - reviewed:  Social History     Socioeconomic History    Marital status:      Spouse name: Not on file    Number of children: Not on file    Years of education: Not on file    Highest education level: Not on file   Occupational History    Not on file   Tobacco Use    Smoking status: Never    Smokeless tobacco: Never   Vaping Use    Vaping Use: Never used   Substance and Sexual Activity    Alcohol use: No    Drug use: Never    Sexual activity: Yes     Partners: Male     Birth control/protection: Pill   Other Topics Concern    Not on file   Social History Narrative    Not on file     Social Determinants of Health     Financial Resource Strain: Medium Risk    Difficulty of Paying Living Expenses: Somewhat hard   Food Insecurity: No Food Insecurity    Worried About Running Out of Food in the Last Year: Never true    Ran Out of Food in the Last Year: Never true   Transportation Needs: Not on file   Physical Activity: Not on file   Stress: Not on file   Social Connections: Not on file   Intimate Partner Violence: Not on file   Housing Stability: Not on file         Immunizations - reviewed:   Immunization History   Administered Date(s) Administered    COVID-19, PFIZER PURPLE top, DILUTE for use, (age 15 y+), IM, 30mcg/0.3mL 05/27/2021, 06/17/2021         Review of Systems   CONSTITUTIONAL: Denies: fever, weight loss, weight gain  CARDIOVASCULAR: Denies: chest pain, dyspnea on exertion, paroxysmal nocturnal dyspnea  RESPIRATORY: Denies: shortness of breath, pleuritic chest pain        Objective:   Visit Vitals  /68 (BP 1 Location: Left upper arm, BP Patient Position: Sitting, BP Cuff Size: Small adult)   Pulse 74   Temp 97.5 °F (36.4 °C) (Temporal)   Resp 16   Ht 5' 4\" (1.626 m)   Wt 159 lb 6.4 oz (72.3 kg)   BMI 27.36 kg/m²       General appearance - alert, well appearing, and in no distress  Eyes - pupils equal and reactive, extraocular eye movements intact  Ears - bilateral TM's and external ear canals normal  Nose - normal and patent, no erythema, discharge or polyps  Mouth - mucous membranes moist, pharynx normal without lesions  Neck - supple, no significant adenopathy  Chest - clear to auscultation, no wheezes, rales or rhonchi, symmetric air entry  Heart - normal rate, regular rhythm, normal S1, S2, no murmurs, rubs, clicks or gallops  Abdomen - soft, nontender, nondistended, no masses or organomegaly  Neurological - alert, oriented, normal speech. Extremities - peripheral pulses normal, no pedal edema, no clubbing or cyanosis  Skin - normal coloration and turgor, no rashes. Assessment:         Diagnoses and all orders for this visit:    1. Well woman exam (no gynecological exam)  -     LIPID PANEL; Future  -     HIV 1/2 AG/AB, 4TH GENERATION,W RFLX CONFIRM; Future  -     T PALLIDUM SCREEN W/REFLEX; Future  -     Nicolasa Collins / GC-AMPLIFIED; Future    2. Metrorrhagia  -     CBC WITH AUTOMATED DIFF; Future    3. Screen for sexually transmitted diseases  -     HIV 1/2 AG/AB, 4TH GENERATION,W RFLX CONFIRM; Future  -     T PALLIDUM SCREEN W/REFLEX; Future  -     Nicolasa Collins / GC-AMPLIFIED; Future    4. Anxiety and depression    Other orders  -     sertraline (ZOLOFT) 50 mg tablet; Take 1 Tablet by mouth daily. Plan:   Counseled re: diet, exercise, healthy lifestyle    Appropriate labs, vaccines, imaging studies, and referrals ordered as listed above    May restart zoloft. Discussed use and common SE. Recommended she consider returning to her counselor. Encouraged social support, regular exercise. Follow up 3-4 weeks. I have discussed the diagnosis with the patient and the intended plan as seen in the above orders. The patient has received an after-visit summary and questions were answered concerning future plans. I have discussed medication side effects and warnings with the patient as well. Informed pt to return to the office if new symptoms arise.       Vick Sorto,

## 2023-04-28 NOTE — PROGRESS NOTES
Chief Complaint   Patient presents with    Complete Physical    Anxiety     Last took Wellbutrin     3 most recent PHQ Screens 4/28/2023   Little interest or pleasure in doing things Several days   Feeling down, depressed, irritable, or hopeless Several days   Total Score PHQ 2 2   Trouble falling or staying asleep, or sleeping too much -   Feeling tired or having little energy -   Poor appetite, weight loss, or overeating -   Feeling bad about yourself - or that you are a failure or have let yourself or your family down -   Trouble concentrating on things such as school, work, reading, or watching TV -   Moving or speaking so slowly that other people could have noticed; or the opposite being so fidgety that others notice -   Thoughts of being better off dead, or hurting yourself in some way -   PHQ 9 Score -   How difficult have these problems made it for you to do your work, take care of your home and get along with others -   Visit Vitals  /68 (BP 1 Location: Left upper arm, BP Patient Position: Sitting, BP Cuff Size: Small adult)   Pulse 74   Temp 97.5 °F (36.4 °C) (Temporal)   Resp 16   Ht 5' 4\" (1.626 m)   Wt 159 lb 6.4 oz (72.3 kg)   BMI 27.36 kg/m²     1. \"Have you been to the ER, urgent care clinic since your last visit? Hospitalized since your last visit? \" [facility]    2. \"Have you seen or consulted any other health care providers outside of the [address] [place] [address] since your last visit? \" no       5. For patients aged 21-65: Has the patient had a pap smear?  Dr. Jessica Babb

## 2023-05-23 NOTE — TELEPHONE ENCOUNTER
PCP: Higinio Beckett DO    Last appt: 04/28/2023  Future Appointments   Date Time Provider Daphne Molina   6/26/2023  1:00 PM Christiane Rivas DO [facility]       Requested Prescriptions     Pending Prescriptions Disp Refills    sertraline (ZOLOFT) 50 MG tablet 90 tablet 0     Sig: Take 1 tablet by mouth daily         Other Comments:Last fill-04/28/2023

## 2023-05-25 DIAGNOSIS — F41.9 ANXIETY DISORDER, UNSPECIFIED TYPE: Primary | ICD-10-CM

## 2023-08-24 ENCOUNTER — TELEPHONE (OUTPATIENT)
Dept: PRIMARY CARE CLINIC | Facility: CLINIC | Age: 33
End: 2023-08-24

## 2023-08-24 DIAGNOSIS — E55.9 VITAMIN D DEFICIENCY: Primary | ICD-10-CM

## 2023-08-24 NOTE — TELEPHONE ENCOUNTER
----- Message from Leander Castanonin sent at 8/24/2023  2:28 PM EDT -----  Subject: Referral Request    Reason for referral request? pt would like to know if she could have   orders for a blood test to check her iron level and vitamin D levels? Provider patient wants to be referred to(if known):     Provider Phone Number(if known): Additional Information for Provider?  Pt states she is feeling super tired   all the time and would like to have bloodwork done ahead of scheduling a   possible appt.   ---------------------------------------------------------------------------  --------------  Chetna Early INFO    [phone]; OK to leave message on voicemail  ---------------------------------------------------------------------------  --------------

## 2023-08-29 ENCOUNTER — TELEPHONE (OUTPATIENT)
Dept: PRIMARY CARE CLINIC | Facility: CLINIC | Age: 33
End: 2023-08-29

## 2023-08-29 DIAGNOSIS — R53.83 OTHER FATIGUE: Primary | ICD-10-CM

## 2023-08-29 NOTE — TELEPHONE ENCOUNTER
Patient calling to get lab results. She stated when I told her about the message Dr. Rehan Nair sent her is cant see at the time.

## 2023-08-30 ENCOUNTER — TELEPHONE (OUTPATIENT)
Dept: PRIMARY CARE CLINIC | Facility: CLINIC | Age: 33
End: 2023-08-30

## 2023-08-30 NOTE — TELEPHONE ENCOUNTER
----- Message from Renée Fine, [place] sent at 8/29/2023  4:38 PM EDT -----  Subject: Referral Request    Reason for referral request? pt calling in to get bloodwork for iron and   vitamin d placed, please call when placed as she wants to do lab work   tomorrow. thanks! Provider patient wants to be referred to(if known):     Provider Phone Number(if known):     Additional Information for Provider?   ---------------------------------------------------------------------------  --------------  Rhonda Elrod Eilda    [phone]; OK to leave message on voicemail  ---------------------------------------------------------------------------  --------------

## 2023-08-31 NOTE — TELEPHONE ENCOUNTER
Call placed to patient to inform that the provider will not be ordering CBC at this time due to the last one was normal.  No answer

## 2023-11-28 ENCOUNTER — OFFICE VISIT (OUTPATIENT)
Age: 33
End: 2023-11-28
Payer: COMMERCIAL

## 2023-11-28 VITALS
BODY MASS INDEX: 25.66 KG/M2 | SYSTOLIC BLOOD PRESSURE: 116 MMHG | TEMPERATURE: 97.1 F | RESPIRATION RATE: 16 BRPM | OXYGEN SATURATION: 98 % | HEIGHT: 65 IN | HEART RATE: 87 BPM | WEIGHT: 154 LBS | DIASTOLIC BLOOD PRESSURE: 63 MMHG

## 2023-11-28 DIAGNOSIS — Z76.89 ENCOUNTER TO ESTABLISH CARE: ICD-10-CM

## 2023-11-28 DIAGNOSIS — F33.1 MODERATE EPISODE OF RECURRENT MAJOR DEPRESSIVE DISORDER (HCC): Primary | ICD-10-CM

## 2023-11-28 PROCEDURE — 99214 OFFICE O/P EST MOD 30 MIN: CPT | Performed by: STUDENT IN AN ORGANIZED HEALTH CARE EDUCATION/TRAINING PROGRAM

## 2023-11-28 RX ORDER — BUPROPION HYDROCHLORIDE 150 MG/1
150 TABLET ORAL EVERY MORNING
Qty: 30 TABLET | Refills: 1 | Status: SHIPPED | OUTPATIENT
Start: 2023-11-28

## 2023-11-28 SDOH — ECONOMIC STABILITY: HOUSING INSECURITY
IN THE LAST 12 MONTHS, WAS THERE A TIME WHEN YOU DID NOT HAVE A STEADY PLACE TO SLEEP OR SLEPT IN A SHELTER (INCLUDING NOW)?: NO

## 2023-11-28 SDOH — ECONOMIC STABILITY: FOOD INSECURITY: WITHIN THE PAST 12 MONTHS, YOU WORRIED THAT YOUR FOOD WOULD RUN OUT BEFORE YOU GOT MONEY TO BUY MORE.: NEVER TRUE

## 2023-11-28 SDOH — ECONOMIC STABILITY: INCOME INSECURITY: HOW HARD IS IT FOR YOU TO PAY FOR THE VERY BASICS LIKE FOOD, HOUSING, MEDICAL CARE, AND HEATING?: NOT HARD AT ALL

## 2023-11-28 SDOH — ECONOMIC STABILITY: FOOD INSECURITY: WITHIN THE PAST 12 MONTHS, THE FOOD YOU BOUGHT JUST DIDN'T LAST AND YOU DIDN'T HAVE MONEY TO GET MORE.: NEVER TRUE

## 2023-11-29 ASSESSMENT — ENCOUNTER SYMPTOMS
DIARRHEA: 0
COUGH: 0
NAUSEA: 0
CONSTIPATION: 0
BLOOD IN STOOL: 0
SHORTNESS OF BREATH: 0
VOMITING: 0
ABDOMINAL PAIN: 0

## 2023-12-26 DIAGNOSIS — F33.1 MODERATE EPISODE OF RECURRENT MAJOR DEPRESSIVE DISORDER (HCC): ICD-10-CM

## 2023-12-26 RX ORDER — BUPROPION HYDROCHLORIDE 150 MG/1
150 TABLET ORAL EVERY MORNING
Qty: 30 TABLET | Refills: 1 | Status: CANCELLED | OUTPATIENT
Start: 2023-12-26

## 2023-12-26 NOTE — TELEPHONE ENCOUNTER
Medication Refill Request    Miranda Jennifer is requesting a refill of the following medication(s):   buPROPion (WELLBUTRIN XL) 150 MG extended release tablet    Please send refill to:     [facility] #[id number] - [place], [place] - [address] - P [phone] - F [phone] [address] [facility] [address]  Phone: [phone] Fax: [phone]

## 2024-08-28 ENCOUNTER — TELEPHONE (OUTPATIENT)
Age: 34
End: 2024-08-28

## 2024-10-07 ENCOUNTER — OFFICE VISIT (OUTPATIENT)
Age: 34
End: 2024-10-07
Payer: COMMERCIAL

## 2024-10-07 VITALS
SYSTOLIC BLOOD PRESSURE: 104 MMHG | WEIGHT: 177.2 LBS | DIASTOLIC BLOOD PRESSURE: 72 MMHG | BODY MASS INDEX: 30.25 KG/M2 | RESPIRATION RATE: 16 BRPM | OXYGEN SATURATION: 98 % | TEMPERATURE: 97.3 F | HEIGHT: 64 IN | HEART RATE: 95 BPM

## 2024-10-07 DIAGNOSIS — Z3A.37 37 WEEKS GESTATION OF PREGNANCY: ICD-10-CM

## 2024-10-07 DIAGNOSIS — Z00.00 ENCOUNTER FOR WELL ADULT EXAM WITHOUT ABNORMAL FINDINGS: Primary | ICD-10-CM

## 2024-10-07 DIAGNOSIS — F33.1 MODERATE EPISODE OF RECURRENT MAJOR DEPRESSIVE DISORDER (HCC): ICD-10-CM

## 2024-10-07 PROCEDURE — 99395 PREV VISIT EST AGE 18-39: CPT | Performed by: STUDENT IN AN ORGANIZED HEALTH CARE EDUCATION/TRAINING PROGRAM

## 2024-10-07 ASSESSMENT — ENCOUNTER SYMPTOMS
ABDOMINAL PAIN: 0
NAUSEA: 0
DIARRHEA: 0
SHORTNESS OF BREATH: 0
BLOOD IN STOOL: 0
VOMITING: 0
CONSTIPATION: 0
COUGH: 0

## 2024-10-07 ASSESSMENT — PATIENT HEALTH QUESTIONNAIRE - PHQ9: DEPRESSION UNABLE TO ASSESS: PT REFUSES

## 2024-10-07 NOTE — PROGRESS NOTES
Alcohol use: No    Drug use: Never           Objective   Vital Signs  /72   Pulse 95   Temp 97.3 °F (36.3 °C) (Temporal)   Resp 16   Ht 1.635 m (5' 4.37\")   Wt 80.4 kg (177 lb 3.2 oz)   SpO2 98%   BMI 30.07 kg/m²     Wt Readings from Last 3 Encounters:   10/07/24 80.4 kg (177 lb 3.2 oz)   11/28/23 69.9 kg (154 lb)   04/28/23 72.3 kg (159 lb 6.4 oz)       Physical Exam  Constitutional:       General: She is not in acute distress.     Appearance: Normal appearance. She is not ill-appearing.   Eyes:      General: No scleral icterus.     Conjunctiva/sclera: Conjunctivae normal.   Neck:      Vascular: No carotid bruit.   Cardiovascular:      Rate and Rhythm: Normal rate and regular rhythm.      Heart sounds: Normal heart sounds. No murmur heard.  Pulmonary:      Effort: Pulmonary effort is normal.      Breath sounds: Normal breath sounds. No wheezing or rales.   Musculoskeletal:      Cervical back: No tenderness.   Lymphadenopathy:      Cervical: No cervical adenopathy.   Neurological:      General: No focal deficit present.      Mental Status: She is alert and oriented to person, place, and time.   Psychiatric:         Mood and Affect: Mood normal.         Behavior: Behavior normal.             Assessment & Plan   Encounter for well adult exam without abnormal findings  - Has had recent labs with OBGYN and labs were normal at visit last year. Deferred updating labs today  - Generally healthy 35 yo F, encouraged to continue with healthy lifestyle and diet  Moderate episode of recurrent major depressive disorder (HCC)  - Resolved, not currently on medication. Not interested in re-starting at this time.   - Advised her to reach out if she feels she is struggling in this regard post-partum  37 weeks gestation of pregnancy  - Continue supervision/management per OBGYN        Return in 1 year (on 10/7/2025) for CPE (Physical Exam).     Personalized Preventive Plan  Current Health Maintenance Status  Immunization

## 2025-08-06 ENCOUNTER — TELEMEDICINE (OUTPATIENT)
Age: 35
End: 2025-08-06
Payer: MEDICAID

## 2025-08-06 DIAGNOSIS — G25.81 RESTLESS LEGS: Primary | ICD-10-CM

## 2025-08-06 DIAGNOSIS — G25.81 RESTLESS LEGS: ICD-10-CM

## 2025-08-06 PROCEDURE — 99213 OFFICE O/P EST LOW 20 MIN: CPT | Performed by: STUDENT IN AN ORGANIZED HEALTH CARE EDUCATION/TRAINING PROGRAM

## 2025-08-07 LAB
ALBUMIN SERPL-MCNC: 3.4 G/DL (ref 3.5–5.2)
ALBUMIN/GLOB SERPL: 1 (ref 1.1–2.2)
ALP SERPL-CCNC: 95 U/L (ref 35–104)
ALT SERPL-CCNC: 13 U/L (ref 10–35)
ANION GAP SERPL CALC-SCNC: 10 MMOL/L (ref 2–14)
AST SERPL-CCNC: 15 U/L (ref 10–35)
BILIRUB SERPL-MCNC: 0.3 MG/DL (ref 0–1.2)
BUN SERPL-MCNC: 8 MG/DL (ref 6–20)
BUN/CREAT SERPL: 12 (ref 12–20)
CALCIUM SERPL-MCNC: 9.1 MG/DL (ref 8.6–10)
CHLORIDE SERPL-SCNC: 103 MMOL/L (ref 98–107)
CO2 SERPL-SCNC: 23 MMOL/L (ref 20–29)
CREAT SERPL-MCNC: 0.67 MG/DL (ref 0.6–1)
ERYTHROCYTE [DISTWIDTH] IN BLOOD BY AUTOMATED COUNT: 13.2 % (ref 11.5–14.5)
FERRITIN SERPL-MCNC: 45 NG/ML (ref 13–252)
GLOBULIN SER CALC-MCNC: 3.6 G/DL (ref 2–4)
GLUCOSE SERPL-MCNC: 82 MG/DL (ref 65–100)
HCT VFR BLD AUTO: 41 % (ref 35–47)
HGB BLD-MCNC: 13.1 G/DL (ref 11.5–16)
IRON SATN MFR SERPL: 17 %
IRON SERPL-MCNC: 67 UG/DL (ref 37–145)
MCH RBC QN AUTO: 25.1 PG (ref 26–34)
MCHC RBC AUTO-ENTMCNC: 32 G/DL (ref 30–36.5)
MCV RBC AUTO: 78.7 FL (ref 80–99)
NRBC # BLD: 0 K/UL (ref 0–0.01)
NRBC BLD-RTO: 0 PER 100 WBC
PLATELET # BLD AUTO: 288 K/UL (ref 150–400)
PMV BLD AUTO: 11.7 FL (ref 8.9–12.9)
POTASSIUM SERPL-SCNC: 4 MMOL/L (ref 3.5–5.1)
PROT SERPL-MCNC: 7 G/DL (ref 6.4–8.3)
RBC # BLD AUTO: 5.21 M/UL (ref 3.8–5.2)
SODIUM SERPL-SCNC: 136 MMOL/L (ref 136–145)
T4 FREE SERPL-MCNC: 1 NG/DL (ref 0.9–1.6)
TIBC SERPL-MCNC: 389 UG/DL (ref 250–450)
TSH, 3RD GENERATION: 1.7 UIU/ML (ref 0.27–4.2)
UIBC SERPL-MCNC: 322 UG/DL (ref 112–347)
WBC # BLD AUTO: 11.7 K/UL (ref 3.6–11)

## (undated) DEVICE — GOWN,SIRUS,NONRNF,SETINSLV,2XL,18/CS: Brand: MEDLINE

## (undated) DEVICE — SUT ETHLN 3-0 18IN PS1 BLK --

## (undated) DEVICE — HOOK LOCK LATEX FREE ELASTIC BANDAGE 4INX5YD

## (undated) DEVICE — STERILE POLYISOPRENE POWDER-FREE SURGICAL GLOVES: Brand: PROTEXIS

## (undated) DEVICE — INFECTION CONTROL KIT SYS

## (undated) DEVICE — DRAPE,REIN 53X77,STERILE: Brand: MEDLINE

## (undated) DEVICE — PREP SKN PREVAIL 40ML APPL --

## (undated) DEVICE — DRAPE,EXTREMITY,89X128,STERILE: Brand: MEDLINE

## (undated) DEVICE — Device

## (undated) DEVICE — CUFF TRNQT DGT MED ELAS ORN --

## (undated) DEVICE — CURITY NON-ADHERENT STRIPS: Brand: CURITY

## (undated) DEVICE — STRETCH BANDAGE ROLL: Brand: DERMACEA

## (undated) DEVICE — SOLUTION IV 1000ML 0.9% SOD CHL

## (undated) DEVICE — GAUZE SPONGES,12 PLY: Brand: CURITY